# Patient Record
Sex: FEMALE | Race: WHITE | NOT HISPANIC OR LATINO | Employment: OTHER | URBAN - METROPOLITAN AREA
[De-identification: names, ages, dates, MRNs, and addresses within clinical notes are randomized per-mention and may not be internally consistent; named-entity substitution may affect disease eponyms.]

---

## 2023-11-21 ENCOUNTER — OFFICE VISIT (OUTPATIENT)
Dept: NEUROLOGY | Facility: CLINIC | Age: 83
End: 2023-11-21
Payer: MEDICARE

## 2023-11-21 VITALS
TEMPERATURE: 97.5 F | WEIGHT: 119 LBS | HEIGHT: 64 IN | DIASTOLIC BLOOD PRESSURE: 72 MMHG | HEART RATE: 62 BPM | SYSTOLIC BLOOD PRESSURE: 119 MMHG | BODY MASS INDEX: 20.32 KG/M2 | RESPIRATION RATE: 18 BRPM

## 2023-11-21 DIAGNOSIS — R25.1 TREMOR: Primary | ICD-10-CM

## 2023-11-21 DIAGNOSIS — G20.A1 PARKINSON'S DISEASE, UNSPECIFIED WHETHER DYSKINESIA PRESENT, UNSPECIFIED WHETHER MANIFESTATIONS FLUCTUATE (MULTI): ICD-10-CM

## 2023-11-21 DIAGNOSIS — R40.4 UNRESPONSIVE EPISODE: ICD-10-CM

## 2023-11-21 DIAGNOSIS — G40.909 SEIZURE DISORDER (MULTI): ICD-10-CM

## 2023-11-21 PROCEDURE — 1160F RVW MEDS BY RX/DR IN RCRD: CPT | Performed by: PSYCHIATRY & NEUROLOGY

## 2023-11-21 PROCEDURE — 1159F MED LIST DOCD IN RCRD: CPT | Performed by: PSYCHIATRY & NEUROLOGY

## 2023-11-21 PROCEDURE — 1036F TOBACCO NON-USER: CPT | Performed by: PSYCHIATRY & NEUROLOGY

## 2023-11-21 PROCEDURE — 99215 OFFICE O/P EST HI 40 MIN: CPT | Performed by: PSYCHIATRY & NEUROLOGY

## 2023-11-21 RX ORDER — APIXABAN 2.5 MG/1
2.5 TABLET, FILM COATED ORAL 2 TIMES DAILY
COMMUNITY
Start: 2023-10-27

## 2023-11-21 RX ORDER — BUSPIRONE HYDROCHLORIDE 15 MG/1
TABLET ORAL
COMMUNITY

## 2023-11-21 RX ORDER — LORAZEPAM 1 MG/1
TABLET ORAL
COMMUNITY

## 2023-11-21 RX ORDER — LEVOTHYROXINE SODIUM 50 UG/1
50 TABLET ORAL DAILY
COMMUNITY

## 2023-11-21 RX ORDER — LAMOTRIGINE 200 MG/1
1 TABLET ORAL 2 TIMES DAILY
COMMUNITY

## 2023-11-21 RX ORDER — EZETIMIBE AND SIMVASTATIN 10; 40 MG/1; MG/1
TABLET ORAL
COMMUNITY

## 2023-11-21 RX ORDER — LANOLIN ALCOHOL/MO/W.PET/CERES
1 CREAM (GRAM) TOPICAL DAILY
COMMUNITY

## 2023-11-21 RX ORDER — ACETAMINOPHEN 500 MG
5000 TABLET ORAL
COMMUNITY

## 2023-11-21 RX ORDER — ONDANSETRON 4 MG/1
TABLET, ORALLY DISINTEGRATING ORAL
COMMUNITY
Start: 2023-10-25

## 2023-11-21 RX ORDER — METOPROLOL TARTRATE 25 MG/1
25 TABLET, FILM COATED ORAL EVERY 12 HOURS
COMMUNITY
Start: 2023-10-27

## 2023-11-21 RX ORDER — SERTRALINE HYDROCHLORIDE 50 MG/1
50 TABLET, FILM COATED ORAL DAILY
COMMUNITY

## 2023-11-21 RX ORDER — DOCUSATE SODIUM 100 MG/1
100 CAPSULE, LIQUID FILLED ORAL 2 TIMES DAILY
COMMUNITY
Start: 2022-12-27

## 2023-11-21 RX ORDER — SITAGLIPTIN 100 MG/1
100 TABLET, FILM COATED ORAL DAILY
COMMUNITY

## 2023-11-21 RX ORDER — QUETIAPINE FUMARATE 50 MG/1
TABLET, FILM COATED ORAL
COMMUNITY
Start: 2022-12-27

## 2023-11-21 RX ORDER — FLUTICASONE PROPIONATE 50 MCG
SPRAY, SUSPENSION (ML) NASAL
COMMUNITY
Start: 2023-10-11

## 2023-11-21 RX ORDER — CARBIDOPA AND LEVODOPA 25; 100 MG/1; MG/1
TABLET ORAL
COMMUNITY

## 2023-11-21 RX ORDER — CARBIDOPA AND LEVODOPA 25; 100 MG/1; MG/1
TABLET, EXTENDED RELEASE ORAL
COMMUNITY

## 2023-11-21 NOTE — PROGRESS NOTES
"Subjective     Kathy Collins is a 83 y.o. year old female here for follow-up for Parkinson's, tremors and seizure- new problem.     HPI  Reason for visit states seizure.  Patient does have an epilepsy doctor.  Dr. Brannon last saw patient 2 years ago.  Her last visit with me 1 year ago, virtual.  She was recently hospitalized and I do not have those records.  She does not think she had seizures.   Accompanied by a granddaughter.    Sugars were high.   Last week during a meal, she slumped over and next thing she remembers was in hospital.    BP was high in hospital.   Her seroquel and ativan was changed 1 week prior to this. Daughter on phone today to provide more history. Also on Ativan as well.  Hydroxyzine is added for prn for anxiety.   No hallucinations.   still on oxygen.   She has fallen.   in the past she tried Rivastigmine patch for memory issues - could not tolerate it- she was on the patch for one night and she had a seizure.      March 25 2020- admission to EMU- on Keppra and VPA, continued to seizure, transferred to [facility] - loaded with fosphenytoin then VPA d/c'ed due to hyperammonemia. Lamictal was increased to 100mg BID and her Keppra and phenytoin were weaned off. last seizure recorded was 3/15.   EEG on video showed \"bilateral arm myoclonic seizure , R head versive seizure then b/l asymmetric clonic. Dr. Donaldson felt that she was close to dying during that hospitalization and she recovered rather well.      Tried TPX for HA but that caused more confusion.   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    prior to her seizure she gets a vertigo sensation, feels she cannot move.      hospitalized at [facility] for heart surgery had a CABG in late 2019.       Current PD meds:    Sinemet 2 /2 /1.5 /1 (8am/12pm/4pm/8pm)   Sinemet ER 1 tab at bedtime goes to bed 11pm - stiff and slow in morning.  20 minutes before she gets chin tremor , hand tremor.     hx:   She " "was diagnosed in August 2016- with PD, hx of tremor since her 20s. She thinks she has had some PD symptoms for a \"long time\"   Review of Systems    There is no problem list on file for this patient.    Past Medical History:   Diagnosis Date    Basal cell carcinoma of skin, unspecified 08/10/2018    Skin cancer, basal cell    Other conditions influencing health status 08/10/2018    Chronic migraine    Personal history of other endocrine, nutritional and metabolic disease 08/10/2018    History of hypothyroidism    Personal history of other endocrine, nutritional and metabolic disease 08/10/2018    History of type 2 diabetes mellitus    Personal history of other mental and behavioral disorders 08/10/2018    History of post traumatic stress disorder    Postural orthostatic tachycardia syndrome (POTS) 08/10/2018    POTS (postural orthostatic tachycardia syndrome)    Sciatica, unspecified side 08/10/2018    Sciatic pain     Past Surgical History:   Procedure Laterality Date    CATARACT EXTRACTION  08/10/2018    Cataract Surgery    HYSTERECTOMY  08/10/2018    Hysterectomy    MASTECTOMY  08/10/2018    Breast Surgery Mastectomy     Social History     Tobacco Use    Smoking status: Not on file    Smokeless tobacco: Not on file   Substance Use Topics    Alcohol use: Not on file     family history is not on file.  No current outpatient medications on file.  Not on File  There were no vitals taken for this visit.  Neurological Exam/Physical Exam:    Constitutional: General appearance: no acute distress. On oxygen.   Auscultation of Heart: Regular rate and rhythm, no murmurs, normal S1 and S2.   Carotid Arteries: no bruits  Peripheral Vascular Exam: No swelling, edema or tenderness to palpation in extremities  Mental status: no distress, alert, interactive and cooperative. Affect is appropriate.   Orientation: oriented to person, oriented to place and oriented to time.   Memory: intact  Attention: normal attention /concentration. "   Language: normal comprehension and naming.   Fund of knowledge: Patient displays adequate knowledge of current events.  Eyes: The ophthalmoscopic examination was normal.   Cranial nerve II: Visual fields full to confrontation.   Cranial nerves III, IV, and VI: Pupils round, equally reactive to light; EOMs intact. No nystagmus.   Cranial Nerve V: Facial sensation intact to LT bilaterally.   Cranial nerve VII: no facial droop  Cranial nerve VIII: Hearing is intact  Cranial nerves IX and X: Palate elevates symmetrically.   Cranial nerve XI: Shoulder shrug intact.   Cranial nerve XII: Tongue is midline.  Motor:  Muscle bulk was normal in both upper and lower extremities.     L> right rigidity. Muscle strength was 5/5 throughout. L > R bradykinesia, moderate kinetic tremor on left more than right. She also has a postural tremor left more than right. Very mild and subtle resting tremor.   Deep Tendon Reflexes: left biceps 2+ , right biceps 2+, left triceps 2+, right triceps 2+, left brachioradialis 2+, right brachioradialis 2+, left patella 2+, right patella 2+, left ankle jerk 2+, right ankle jerk 2+   Plantar Reflex: Toes downgoing to plantar stimulation on the left. Toes downgoing to plantar stimulation on the right.   Sensory Exam: Normal to vibratory sensation  Coordination:  no limb dystaxia and rapid alternating movements are intact.   Gait:  cautious.  Uses a cane.       Labs:  CBC:   Lab Results   Component Value Date    WBC 5.6 03/22/2020    HGB 11.0 (L) 03/22/2020    HCT 35.1 (L) 03/22/2020     03/22/2020     BMP:   Lab Results   Component Value Date     03/22/2020    K 3.8 03/22/2020     03/22/2020    CO2 29 03/22/2020    BUN 12 03/22/2020    CREATININE 0.68 03/22/2020    CALCIUM 8.6 03/22/2020    MG 1.91 03/20/2020    PHOS 3.3 03/18/2020     LFT:   Lab Results   Component Value Date    ALKPHOS 69 03/18/2020    BILITOT 0.4 03/18/2020    BILIDIR 0.1 03/15/2020    PROT 5.1 (L) 03/18/2020     ALBUMIN 3.0 (L) 03/18/2020    ALT <3 (L) 03/18/2020    AST 7 (L) 03/18/2020         Assessment/Plan   Problem List Items Addressed This Visit    None   Sugar related vs BP related.  Obtain EEG.  Hydrate well and compression socks will help. BP monitoring.

## 2023-11-21 NOTE — PATIENT INSTRUCTIONS
"It was a pleasure seeing you today.     This may be related to blood pressure changes.    Hydrate well , 64 ounces daily. Wear compression socks.     Take blood pressure and heart rate laying down , wait 2 min then take this again then sit and wait 2 minutes and take this again then stand and do this again ( 3 separate Blood pressure / heart rate). Record this and whether you are dizzy.    Obtain EEG and see Dr. Brannon from epilepsy.       You may have orthostatic hypotension ( which Seroquel can worsen).    If still having Blood pressure changes/dizziness and orthostatic hypotension - we may consider lowering Sinemet to 1.5 tabs at 8am/ 1. 5 tabs at 12pm/ 1.5 tabs at 4pm / 1 tab at 8pm   Please make a follow up appointment with me for Parkinson's /tremors in 4-6 months.      For any urgent issues or needing to speak to a medical assistant please call [phone], option 6 during our office hours Monday-Friday 8am-4pm, and leave a voicemail with your concern.  My office will try to reach back you as soon as possible within 24 (business) hours.  If you have an emergency please call 911 or visit a local urgent care or nearest emergency room.      Please understand that [employer] [facility] is a useful communication tool for simple \"normal\" results or a refill request but I would not recommend using this tool for emergent or urgent issues or for conversations with me.  I am happy to ask my staff to rearrange a follow up visit or a virtual visit sooner than requested if appropriate for your care.     "

## 2023-11-29 ENCOUNTER — HOSPITAL ENCOUNTER (OUTPATIENT)
Dept: NEUROLOGY | Facility: HOSPITAL | Age: 83
Discharge: HOME | End: 2023-11-29
Payer: MEDICARE

## 2023-11-29 DIAGNOSIS — R40.4 UNRESPONSIVE EPISODE: ICD-10-CM

## 2023-11-29 DIAGNOSIS — G40.909 SEIZURE DISORDER (MULTI): ICD-10-CM

## 2023-11-29 PROCEDURE — 95816 EEG AWAKE AND DROWSY: CPT | Performed by: STUDENT IN AN ORGANIZED HEALTH CARE EDUCATION/TRAINING PROGRAM

## 2023-11-29 PROCEDURE — 95816 EEG AWAKE AND DROWSY: CPT

## 2024-03-22 ENCOUNTER — APPOINTMENT (OUTPATIENT)
Dept: BEHAVIORAL HEALTH | Facility: CLINIC | Age: 84
End: 2024-03-22
Payer: MEDICARE

## 2024-08-22 ENCOUNTER — APPOINTMENT (OUTPATIENT)
Dept: NEUROLOGY | Facility: CLINIC | Age: 84
End: 2024-08-22
Payer: MEDICARE

## 2024-08-22 VITALS — WEIGHT: 121.6 LBS | HEART RATE: 71 BPM | HEIGHT: 64 IN | RESPIRATION RATE: 18 BRPM | BODY MASS INDEX: 20.76 KG/M2

## 2024-08-22 DIAGNOSIS — R25.1 TREMOR: ICD-10-CM

## 2024-08-22 DIAGNOSIS — G20.A1 PARKINSON'S DISEASE, UNSPECIFIED WHETHER DYSKINESIA PRESENT, UNSPECIFIED WHETHER MANIFESTATIONS FLUCTUATE (MULTI): Primary | ICD-10-CM

## 2024-08-22 PROCEDURE — 1157F ADVNC CARE PLAN IN RCRD: CPT | Performed by: PSYCHIATRY & NEUROLOGY

## 2024-08-22 PROCEDURE — G2211 COMPLEX E/M VISIT ADD ON: HCPCS | Performed by: PSYCHIATRY & NEUROLOGY

## 2024-08-22 PROCEDURE — 99214 OFFICE O/P EST MOD 30 MIN: CPT | Performed by: PSYCHIATRY & NEUROLOGY

## 2024-08-22 PROCEDURE — 1159F MED LIST DOCD IN RCRD: CPT | Performed by: PSYCHIATRY & NEUROLOGY

## 2024-08-22 PROCEDURE — 1160F RVW MEDS BY RX/DR IN RCRD: CPT | Performed by: PSYCHIATRY & NEUROLOGY

## 2024-08-22 PROCEDURE — 1036F TOBACCO NON-USER: CPT | Performed by: PSYCHIATRY & NEUROLOGY

## 2024-08-22 RX ORDER — MEMANTINE HYDROCHLORIDE 10 MG/1
10 TABLET ORAL DAILY
COMMUNITY

## 2024-08-22 RX ORDER — HYDROXYZINE HYDROCHLORIDE 10 MG/1
TABLET, FILM COATED ORAL DAILY PRN
COMMUNITY

## 2024-08-22 RX ORDER — SULFAMETHOXAZOLE AND TRIMETHOPRIM 400; 80 MG/1; MG/1
1 TABLET ORAL 2 TIMES DAILY
COMMUNITY

## 2024-08-22 RX ORDER — POLYETHYLENE GLYCOL 3350 17 G/17G
17 POWDER, FOR SOLUTION ORAL DAILY
COMMUNITY

## 2024-08-22 NOTE — PROGRESS NOTES
"Subjective     Kathy Collins is a 84 y.o. year old female here for follow-up for Parkinson's, tremors and seizure.    HPI    Accompanied by a granddaughter.      She is doing well.  Sleep is good.  She is falling, usually from a seated position.    She is seeing psychiatry soon as well.   Tremors seem worse to her.  Feeding/ eating is tough with tremors. Memory is not good.   Denies SI/HI.     No hallucinations.   still on oxygen.   in the past she tried Rivastigmine patch for memory issues - could not tolerate it- she was on the patch for one night and she had a seizure.      March 25 2020- admission to EMU- on Keppra and VPA, continued to seizure, transferred to [facility]U - loaded with fosphenytoin then VPA d/c'ed due to hyperammonemia. Lamictal was increased to 100mg BID and her Keppra and phenytoin were weaned off. last seizure recorded was 3/15.   EEG on video showed \"bilateral arm myoclonic seizure , R head versive seizure then b/l asymmetric clonic. Dr. Donaldson felt that she was close to dying during that hospitalization and she recovered rather well.      Tried TPX for HA but that caused more confusion.   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    prior to her seizure she gets a vertigo sensation, feels she cannot move.      hospitalized at [facility] for heart surgery had a CABG in late 2019.       Current PD meds:    Sinemet 2 /2 /1.5 /1 (8am/12pm/4pm/8pm)   Sinemet ER 1 tab at bedtime goes to bed 11pm - stiff and slow in morning.  20 minutes before she gets chin tremor , hand tremor.     hx:   She was diagnosed in August 2016- with PD, hx of tremor since her 20s. She thinks she has had some PD symptoms for a \"long time\"   Review of Systems    There is no problem list on file for this patient.    Past Medical History:   Diagnosis Date    Basal cell carcinoma of skin, unspecified 08/10/2018    Skin cancer, basal cell    Other conditions influencing health status " 08/10/2018    Chronic migraine    Personal history of other endocrine, nutritional and metabolic disease 08/10/2018    History of hypothyroidism    Personal history of other endocrine, nutritional and metabolic disease 08/10/2018    History of type 2 diabetes mellitus    Personal history of other mental and behavioral disorders 08/10/2018    History of post traumatic stress disorder    Postural orthostatic tachycardia syndrome (POTS) 08/10/2018    POTS (postural orthostatic tachycardia syndrome)    Sciatica, unspecified side 08/10/2018    Sciatic pain     Past Surgical History:   Procedure Laterality Date    CATARACT EXTRACTION  08/10/2018    Cataract Surgery    HYSTERECTOMY  08/10/2018    Hysterectomy    MASTECTOMY  08/10/2018    Breast Surgery Mastectomy     Social History     Tobacco Use    Smoking status: Former     Types: Cigarettes    Smokeless tobacco: Never   Substance Use Topics    Alcohol use: Not Currently     family history is not on file.    Current Outpatient Medications:     aspirin-calcium carbonate 81 mg-300 mg calcium(777 mg) tablet, Take 1 tablet by mouth once daily., Disp: , Rfl:     busPIRone (Buspar) 15 mg tablet, TAKE 1 TABLET (15MG) BY MOUTH 3 TIMES DAILY AND TAKE 1/2 TABLET (7.5MG) BY MOUTH AT BEDTIME, Disp: , Rfl:     carbidopa-levodopa (Sinemet CR)  mg ER tablet, TAKE ONE(1) TABLET BY MOUTH ONCE DAILY **DO NOT CRUSH**, Disp: , Rfl:     carbidopa-levodopa (Sinemet)  mg tablet, MAKE 2 TABLETS BY MOUTH DAILY AT 8AM AND NOON, TAKE 1.5 TABS DAILY AT 4PM AND 1 TAB DAILY AT 8PM **STO, Disp: , Rfl:     cholecalciferol (Vitamin D-3) 5,000 Units tablet, Take 1 tablet (5,000 Units) by mouth once daily., Disp: , Rfl:     cyanocobalamin (Vitamin B-12) 1,000 mcg tablet, Take 1 tablet (1,000 mcg) by mouth once daily., Disp: , Rfl:     docusate sodium (Colace) 100 mg capsule, Take 1 capsule (100 mg) by mouth 2 times a day., Disp: , Rfl:     Eliquis 2.5 mg tablet, Take 1 tablet (2.5 mg) by  mouth 2 times a day., Disp: , Rfl:     ezetimibe-simvastatin (Vytorin) 10-40 mg tablet, TAKE ONE(1) TABLET BY MOUTH ONCE DAILY, Disp: , Rfl:     fluticasone (Flonase) 50 mcg/actuation nasal spray, USE 1 SPRAY INTO EACH NOSTRIL TWICE DAILY, Disp: , Rfl:     Januvia 100 mg tablet, Take 1 tablet (100 mg) by mouth once daily., Disp: , Rfl:     lamoTRIgine (LaMICtal) 200 mg tablet, Take 1 tablet (200 mg) by mouth 2 times a day., Disp: , Rfl:     levothyroxine (Synthroid, Levoxyl) 50 mcg tablet, Take 1 tablet (50 mcg) by mouth once daily., Disp: , Rfl:     LORazepam (Ativan) 1 mg tablet, TAKE 1 TABLET BY MOUTH 3 TIMES DAILY **RE-ORDER ACCORDINGLY**, Disp: , Rfl:     metoprolol tartrate (Lopressor) 25 mg tablet, Take 1 tablet (25 mg) by mouth every 12 hours., Disp: , Rfl:     ondansetron ODT (Zofran-ODT) 4 mg disintegrating tablet, DISSOLVE 1 TABLET S/L EVERY 6 HOURS FOR NAUSEA., Disp: , Rfl:     QUEtiapine (SEROquel) 50 mg tablet, TAKE ONE(1) TABLET BY MOUTH ONCE DAILY, Disp: , Rfl:     sertraline (Zoloft) 50 mg tablet, Take 1 tablet (50 mg) by mouth once daily., Disp: , Rfl:   No Known Allergies  There were no vitals taken for this visit.  Neurological Exam/Physical Exam:    Constitutional: General appearance: no acute distress. On oxygen.   Auscultation of Heart: Regular rate and rhythm, no murmurs, normal S1 and S2.   Carotid Arteries: no bruits  Peripheral Vascular Exam: No swelling, edema or tenderness to palpation in extremities  Mental status: no distress, alert, interactive and cooperative. Affect is appropriate.   Orientation: oriented to person, oriented to place and oriented to time.     Fund of knowledge: Patient displays adequate knowledge of current events.  Eyes: The ophthalmoscopic examination was normal.   Cranial nerve II: Visual fields full to confrontation.   Cranial nerves III, IV, and VI: Pupils round, equally reactive to light; EOMs intact. No nystagmus.   Cranial Nerve V: Facial sensation intact to  LT bilaterally.   Cranial nerve VII: no facial droop  Cranial nerve VIII: Hearing is intact  Cranial nerves IX and X: Palate elevates symmetrically.   Cranial nerve XI: Shoulder shrug intact.   Cranial nerve XII: Tongue is midline.  Motor:  Muscle bulk was normal in both upper and lower extremities.     L> right rigidity. Muscle strength was 5/5 throughout. L > R bradykinesia, moderate kinetic tremor on left more than right. She also has a postural tremor left more than right. Very mild and subtle resting tremor.   Deep Tendon Reflexes: left biceps 2+ , right biceps 2+, left triceps 2+, right triceps 2+, left brachioradialis 2+, right brachioradialis 2+, left patella 2+, right patella 2+, left ankle jerk 2+, right ankle jerk 2+   Plantar Reflex: Toes downgoing to plantar stimulation on the left. Toes downgoing to plantar stimulation on the right.   Sensory Exam: Normal to vibratory sensation  Coordination:  no limb dystaxia and rapid alternating movements are intact.   Gait:  cautious.  Uses a cane.       Labs:  CBC:   Lab Results   Component Value Date    WBC 5.6 03/22/2020    HGB 11.0 (L) 03/22/2020    HCT 35.1 (L) 03/22/2020     03/22/2020     BMP:   Lab Results   Component Value Date     03/22/2020    K 3.8 03/22/2020     03/22/2020    CO2 29 03/22/2020    BUN 12 03/22/2020    CREATININE 0.68 03/22/2020    CALCIUM 8.6 03/22/2020    MG 1.91 03/20/2020    PHOS 3.3 03/18/2020     LFT:   Lab Results   Component Value Date    ALKPHOS 69 03/18/2020    BILITOT 0.4 03/18/2020    BILIDIR 0.1 03/15/2020    PROT 5.1 (L) 03/18/2020    ALBUMIN 3.0 (L) 03/18/2020    ALT <3 (L) 03/18/2020    AST 7 (L) 03/18/2020         Assessment/Plan   Problem List Items Addressed This Visit    None  No changes to medications.  Balance and tremors are worse.

## 2024-08-22 NOTE — PATIENT INSTRUCTIONS
"It was a pleasure seeing you today.     Please make a follow up appointment in 5-6 months.  You may also schedule a phone or virtual visit sooner on a Friday morning with me as needed before the next clinic appointment.     For any urgent issues or needing to speak to a medical assistant please call [phone], option 6 during our office hours Monday-Friday 8am-4pm, and leave a voicemail with your concern.  My office will try to reach back you as soon as possible within 24 (business) hours.  If you have an emergency please call 911 or visit a local urgent care or nearest emergency room.      Please understand that [employer] is a useful communication tool for simple \"normal\" results or a refill request but I would not recommend using this tool for emergent or urgent issues or for conversations with me.  I am happy to ask my staff to rearrange a follow up visit or a virtual visit sooner than requested if appropriate for your care.    "

## 2024-09-20 ENCOUNTER — APPOINTMENT (OUTPATIENT)
Dept: BEHAVIORAL HEALTH | Facility: CLINIC | Age: 84
End: 2024-09-20
Payer: MEDICARE

## 2024-09-20 DIAGNOSIS — R44.1 VISUAL HALLUCINATION: ICD-10-CM

## 2024-09-20 DIAGNOSIS — F41.1 GAD (GENERALIZED ANXIETY DISORDER): ICD-10-CM

## 2024-09-20 DIAGNOSIS — F32.89 OTHER DEPRESSION: ICD-10-CM

## 2024-09-20 PROCEDURE — 1157F ADVNC CARE PLAN IN RCRD: CPT | Performed by: PSYCHIATRY & NEUROLOGY

## 2024-09-20 PROCEDURE — 99214 OFFICE O/P EST MOD 30 MIN: CPT | Performed by: PSYCHIATRY & NEUROLOGY

## 2024-09-20 RX ORDER — MEGESTROL ACETATE 40 MG/ML
SUSPENSION ORAL DAILY
COMMUNITY

## 2024-09-20 RX ORDER — FOLIC ACID 0.4 MG
TABLET ORAL DAILY
COMMUNITY

## 2024-09-20 NOTE — PROGRESS NOTES
"Outpatient Psychiatry Follow up visit    Ms. Kathy Collins is an 84 year old woman with a history of generalized anxiety disorder, mood disorder, cognitive impairment, essential tremors and Parkinson's disease. Follow up done virtually today.   Seen with daughter Ruth.     Virtual or Telephone Consent    An interactive audio and video telecommunication system which permits real time communications between the patient (at the originating site) and provider (at the distant site) was utilized to provide this telehealth service.   Verbal consent was requested and obtained from Kathy Collins on this date, 09/20/24 for a telehealth visit.     Subjective:     She says she is feeling \"really good.\"     She had a bout of CoVID about 3 weeks ago and had hallucinations, confusion, falls. Ruth notes that patient was asking nurses to get her granddaughter ready for school or looking for Ruth. These symptoms have improved.     Patient says she still sees a cat. She says it is not bothersome.   She may confuse Ruth with another nurse.      She says she is in assisted living ([facility]) in October '22.   She is taking care of her own ADLs.       She nurses administer the medications.   She says she is eating well. Had lost weight when she had COVID but has gained weight back.   Sleep - says it is okay.   Denies any death wishes.  Denies any suicidal thoughts, intent or plans.      She says she still feels anxious. She says that as long as nothing is bothering her, she is okay, but every day around 4 pm, she starts to feel irritable.     She tried rivastigmine patch for memory but could not tolerate it.   Does not drive.     She has slight trouble swallowing. She has to watch what she is eating. She is taking pills with apple sauce.      She says she goes to programs and groups at the assisted living; she says they have a lot of activities.     She has PT. Her balance is still not good. " "She uses a rollator most of the time.      She is on oxygen 24/7.      She has not had any seizures recently.      She continues to see psychologist Dr. Juan Miguel Licona.      No change in PD medications.      I reviewed medication list from facility. Current medication list includes:   Quetiapine 50mg at bedtime  Sertraline 75mg once daily  Buspar 15mg in the morning, 15mg at 2pm, 15mg in the evening, 7.5mg at bedtime  Lamotrigine 200mg twice daily (also for seizures).  Ativan 1mg in the morning and 1mg around 4pm.   Memantine 10mg daily  Hydroxyzine 10mg three times daily as needed for anxiety    Psychiatric Review Of Systems:   As above.     Medical Review Of Systems:    Pertinent items are noted in HPI.    Record Review: moderate       Mental Status Evaluation:  Appearance: Appears to be stated age. Well groomed. Good hygiene.   Behavior/Attitude: Cooperative. Pleasant.   Motor: Psychomotor activity in average range. Tremors in hands.   Speech: Soft.   Mood: \"really good\"  Affect: Congruent to stated mood. Mobilized appropriately. In no apparent distress.   Thought process: Fairly goal-directed.   Thought content: No paranoia, delusion or ideas of reference elicited. Reports visual hallucinations. No recent paranoia.   Suicidal ideation: denied.  Homicidal ideation: denied.   Insight: Partial.   Judgment: Fair.   Recent and remote memory: impaired.   Attention/concentration: intact during appointment.   Language: No aphasia or paraphasic errors. Had word-finding difficulties.   Fund of knowledge: Average.     PMH/PSH:  Past Medical History:   Diagnosis Date    Basal cell carcinoma of skin, unspecified 08/10/2018    Skin cancer, basal cell    Other conditions influencing health status 08/10/2018    Chronic migraine    Personal history of other endocrine, nutritional and metabolic disease 08/10/2018    History of hypothyroidism    Personal history of other endocrine, nutritional and metabolic disease 08/10/2018    " History of type 2 diabetes mellitus    Personal history of other mental and behavioral disorders 08/10/2018    History of post traumatic stress disorder    Postural orthostatic tachycardia syndrome (POTS) 08/10/2018    POTS (postural orthostatic tachycardia syndrome)    Sciatica, unspecified side 08/10/2018    Sciatic pain        Meds  Current Outpatient Medications on File Prior to Visit   Medication Sig Dispense Refill    aspirin-calcium carbonate 81 mg-300 mg calcium(777 mg) tablet Take 1 tablet by mouth once daily.      B complex-vitamin C-folic acid (Nephro-[facility] Rx) 1- mg-mg-mcg tablet Take 1 tablet by mouth once daily with breakfast.      busPIRone (Buspar) 15 mg tablet TAKE 1 TABLET (15MG) BY MOUTH 3 TIMES DAILY AND TAKE 1/2 TABLET (7.5MG) BY MOUTH AT BEDTIME      carbidopa-levodopa (Sinemet CR)  mg ER tablet TAKE ONE(1) TABLET BY MOUTH ONCE DAILY **DO NOT CRUSH**      carbidopa-levodopa (Sinemet)  mg tablet MAKE 2 TABLETS BY MOUTH DAILY AT 8AM AND NOON, TAKE 1.5 TABS DAILY AT 4PM AND 1 TAB DAILY AT 8PM **STO      cholecalciferol (Vitamin D-3) 5,000 Units tablet Take 1 tablet (5,000 Units) by mouth once daily.      cyanocobalamin (Vitamin B-12) 1,000 mcg tablet Take 1 tablet (1,000 mcg) by mouth once daily.      docusate sodium (Colace) 100 mg capsule Take 1 capsule (100 mg) by mouth 2 times a day.      Eliquis 2.5 mg tablet Take 1 tablet (2.5 mg) by mouth 2 times a day.      ezetimibe-simvastatin (Vytorin) 10-40 mg tablet TAKE ONE(1) TABLET BY MOUTH ONCE DAILY      fluticasone (Flonase) 50 mcg/actuation nasal spray USE 1 SPRAY INTO EACH NOSTRIL TWICE DAILY      hydrOXYzine HCL (Atarax) 10 mg tablet Take by mouth once daily as needed for itching.      Januvia 100 mg tablet Take 1 tablet (100 mg) by mouth once daily.      lamoTRIgine (LaMICtal) 200 mg tablet Take 1 tablet (200 mg) by mouth 2 times a day.      levothyroxine (Synthroid, Levoxyl) 75 mcg tablet Take 1 tablet (75 mcg) by mouth  once daily.      LORazepam (Ativan) 1 mg tablet 1 tablet (1 mg) 2 times a day.      memantine (Namenda) 10 mg tablet Take 1 tablet (10 mg) by mouth once daily.      metoprolol tartrate (Lopressor) 25 mg tablet Take 1 tablet (25 mg) by mouth every 12 hours.      ondansetron ODT (Zofran-ODT) 4 mg disintegrating tablet DISSOLVE 1 TABLET S/L EVERY 6 HOURS FOR NAUSEA.      polyethylene glycol (Glycolax, Miralax) 17 gram packet Take 17 g by mouth once daily.      QUEtiapine (SEROquel) 50 mg tablet TAKE ONE(1) TABLET BY MOUTH ONCE DAILY      sertraline (Zoloft) 50 mg tablet Take 1.5 tablets (75 mg) by mouth once daily.      sulfamethoxazole-trimethoprim (Bactrim) 400-80 mg tablet Take 1 tablet by mouth 2 times a day.       No current facility-administered medications on file prior to visit.        Allergies:   No Known Allergies      Assessment/Plan   Assessment:   Ms. Kathy Collins is an 84 year old woman with a history of generalized anxiety disorder, mood disorder, cognitive impairment, essential tremors and Parkinson's disease. Follow up done virtually today.      9/20/24 - She had increased confusion due to COVID. She has been more anxious, specifically at 4pm every day.     Diagnosis:   Generalized anxiety disorder  Mood disorder (?bipolar disorder)   Visual hallucinations  Cognitive impairment - likely multifactorial.  History of PTSD    Treatment Plan/Recommendations:   - Continue quetiapine 50mg at bedtime. Monitor hallucinations for any worsening.   - Discussed that her increase in anxiety at 4pm could be related to medication timing. Continue lorazepam 1mg twice daily but take second dose at 2pm.   - Advised to limit use of hydroxyzine as much as possible.   - Continue buspar - 15mg at 8am, 12pm and 4pm; 7.5mg at bedtime.   - Continue sertraline 75mg once daily.   - Continue lamictal 200mg twice daily (as prescribed by PCP).   - Follow up in about 2-3 months.      Darling Christianson MD.

## 2024-09-20 NOTE — PATIENT INSTRUCTIONS
Plan:   - Change lorazepam to 1mg in the morning and 1mg at 2pm.   - Continue quetiapine 50mg at bedtime. Monitor hallucinations for any worsening.   - Try to limit use of hydroxyzine as much as possible.   - Continue buspar - 15mg at 8am, 12pm and 4pm; 7.5mg at bedtime.   - Continue sertraline 75mg once daily.   - Continue lamictal 200mg twice daily (as prescribed by PCP).   - Follow up in about 2-3 months.  - Contact via [employer] or call sooner ([phone]) in case of any questions or concerns.  - Call [phone] for mental health crisis or suicidal thoughts, or call 911 or go to the nearest emergency room for emergencies.    Brain-healthy lifestyle:   - Make sure your medical conditions (if any) such as diabetes, high blood pressure, high cholesterol, thyroid disease sleep apnea are optimally controlled.   - Use eyeglasses or hearing aids appropriately if needed.   - Eat a heart healthy diet (like a Mediterranean diet; lots of fruits and vegetables, fish; low fat;)  - Exercise regularly as tolerated.   - Maintain good sleep hygiene; avoid daytime naps; try to get 7 to 8 hours of continuous sleep at night.   - Stay mentally active - puzzles, word searches, books, playing cards.  - Stay socially active and engaged.

## 2024-12-03 ENCOUNTER — APPOINTMENT (OUTPATIENT)
Dept: RADIOLOGY | Facility: HOSPITAL | Age: 84
End: 2024-12-03
Payer: MEDICARE

## 2024-12-03 ENCOUNTER — TELEPHONE (OUTPATIENT)
Dept: NEUROLOGY | Facility: CLINIC | Age: 84
End: 2024-12-03
Payer: MEDICARE

## 2024-12-03 ENCOUNTER — HOSPITAL ENCOUNTER (OUTPATIENT)
Facility: HOSPITAL | Age: 84
Setting detail: OBSERVATION
Discharge: HOME | End: 2024-12-04
Attending: STUDENT IN AN ORGANIZED HEALTH CARE EDUCATION/TRAINING PROGRAM | Admitting: EMERGENCY MEDICINE
Payer: MEDICARE

## 2024-12-03 DIAGNOSIS — N30.00 ACUTE CYSTITIS WITHOUT HEMATURIA: ICD-10-CM

## 2024-12-03 DIAGNOSIS — R41.82 ALTERED MENTAL STATUS, UNSPECIFIED ALTERED MENTAL STATUS TYPE: Primary | ICD-10-CM

## 2024-12-03 LAB
ALBUMIN SERPL BCP-MCNC: 4.5 G/DL (ref 3.4–5)
ALP SERPL-CCNC: 93 U/L (ref 33–136)
ALT SERPL W P-5'-P-CCNC: <3 U/L (ref 7–45)
ANION GAP SERPL CALC-SCNC: 14 MMOL/L (ref 10–20)
APPEARANCE UR: CLEAR
AST SERPL W P-5'-P-CCNC: 12 U/L (ref 9–39)
BASOPHILS # BLD AUTO: 0.05 X10*3/UL (ref 0–0.1)
BASOPHILS NFR BLD AUTO: 0.5 %
BILIRUB SERPL-MCNC: 0.4 MG/DL (ref 0–1.2)
BILIRUB UR STRIP.AUTO-MCNC: NEGATIVE MG/DL
BUN SERPL-MCNC: 27 MG/DL (ref 6–23)
CALCIUM SERPL-MCNC: 9.2 MG/DL (ref 8.6–10.6)
CHLORIDE SERPL-SCNC: 103 MMOL/L (ref 98–107)
CO2 SERPL-SCNC: 26 MMOL/L (ref 21–32)
COLOR UR: YELLOW
CREAT SERPL-MCNC: 1.57 MG/DL (ref 0.5–1.05)
EGFRCR SERPLBLD CKD-EPI 2021: 32 ML/MIN/1.73M*2
EOSINOPHIL # BLD AUTO: 0.25 X10*3/UL (ref 0–0.4)
EOSINOPHIL NFR BLD AUTO: 2.5 %
ERYTHROCYTE [DISTWIDTH] IN BLOOD BY AUTOMATED COUNT: 13.2 % (ref 11.5–14.5)
GLUCOSE SERPL-MCNC: 157 MG/DL (ref 74–99)
GLUCOSE UR STRIP.AUTO-MCNC: NORMAL MG/DL
HCT VFR BLD AUTO: 35.4 % (ref 36–46)
HGB BLD-MCNC: 11.8 G/DL (ref 12–16)
HOLD SPECIMEN: NORMAL
IMM GRANULOCYTES # BLD AUTO: 0.06 X10*3/UL (ref 0–0.5)
IMM GRANULOCYTES NFR BLD AUTO: 0.6 % (ref 0–0.9)
KETONES UR STRIP.AUTO-MCNC: NEGATIVE MG/DL
LEUKOCYTE ESTERASE UR QL STRIP.AUTO: ABNORMAL
LYMPHOCYTES # BLD AUTO: 1.92 X10*3/UL (ref 0.8–3)
LYMPHOCYTES NFR BLD AUTO: 19.5 %
MCH RBC QN AUTO: 30.7 PG (ref 26–34)
MCHC RBC AUTO-ENTMCNC: 33.3 G/DL (ref 32–36)
MCV RBC AUTO: 92 FL (ref 80–100)
MONOCYTES # BLD AUTO: 0.74 X10*3/UL (ref 0.05–0.8)
MONOCYTES NFR BLD AUTO: 7.5 %
MUCOUS THREADS #/AREA URNS AUTO: ABNORMAL /LPF
NEUTROPHILS # BLD AUTO: 6.82 X10*3/UL (ref 1.6–5.5)
NEUTROPHILS NFR BLD AUTO: 69.4 %
NITRITE UR QL STRIP.AUTO: NEGATIVE
NRBC BLD-RTO: 0 /100 WBCS (ref 0–0)
PH UR STRIP.AUTO: 5.5 [PH]
PLATELET # BLD AUTO: 235 X10*3/UL (ref 150–450)
POTASSIUM SERPL-SCNC: 4.1 MMOL/L (ref 3.5–5.3)
PROT SERPL-MCNC: 7.6 G/DL (ref 6.4–8.2)
PROT UR STRIP.AUTO-MCNC: NEGATIVE MG/DL
RBC # BLD AUTO: 3.84 X10*6/UL (ref 4–5.2)
RBC # UR STRIP.AUTO: NEGATIVE /UL
RBC #/AREA URNS AUTO: ABNORMAL /HPF
SODIUM SERPL-SCNC: 139 MMOL/L (ref 136–145)
SP GR UR STRIP.AUTO: 1.01
UROBILINOGEN UR STRIP.AUTO-MCNC: NORMAL MG/DL
WBC # BLD AUTO: 9.8 X10*3/UL (ref 4.4–11.3)
WBC #/AREA URNS AUTO: ABNORMAL /HPF

## 2024-12-03 PROCEDURE — 81001 URINALYSIS AUTO W/SCOPE: CPT | Performed by: STUDENT IN AN ORGANIZED HEALTH CARE EDUCATION/TRAINING PROGRAM

## 2024-12-03 PROCEDURE — 99285 EMERGENCY DEPT VISIT HI MDM: CPT | Mod: 25

## 2024-12-03 PROCEDURE — 2500000004 HC RX 250 GENERAL PHARMACY W/ HCPCS (ALT 636 FOR OP/ED)

## 2024-12-03 PROCEDURE — 36415 COLL VENOUS BLD VENIPUNCTURE: CPT | Performed by: STUDENT IN AN ORGANIZED HEALTH CARE EDUCATION/TRAINING PROGRAM

## 2024-12-03 PROCEDURE — 72100 X-RAY EXAM L-S SPINE 2/3 VWS: CPT

## 2024-12-03 PROCEDURE — 80053 COMPREHEN METABOLIC PANEL: CPT | Performed by: STUDENT IN AN ORGANIZED HEALTH CARE EDUCATION/TRAINING PROGRAM

## 2024-12-03 PROCEDURE — 74176 CT ABD & PELVIS W/O CONTRAST: CPT

## 2024-12-03 PROCEDURE — 96365 THER/PROPH/DIAG IV INF INIT: CPT

## 2024-12-03 PROCEDURE — 72131 CT LUMBAR SPINE W/O DYE: CPT | Mod: RCN

## 2024-12-03 PROCEDURE — 71045 X-RAY EXAM CHEST 1 VIEW: CPT | Performed by: STUDENT IN AN ORGANIZED HEALTH CARE EDUCATION/TRAINING PROGRAM

## 2024-12-03 PROCEDURE — 71045 X-RAY EXAM CHEST 1 VIEW: CPT

## 2024-12-03 PROCEDURE — 70450 CT HEAD/BRAIN W/O DYE: CPT

## 2024-12-03 PROCEDURE — 85025 COMPLETE CBC W/AUTO DIFF WBC: CPT | Performed by: STUDENT IN AN ORGANIZED HEALTH CARE EDUCATION/TRAINING PROGRAM

## 2024-12-03 PROCEDURE — 99283 EMERGENCY DEPT VISIT LOW MDM: CPT | Mod: 25 | Performed by: STUDENT IN AN ORGANIZED HEALTH CARE EDUCATION/TRAINING PROGRAM

## 2024-12-03 PROCEDURE — 70450 CT HEAD/BRAIN W/O DYE: CPT | Performed by: STUDENT IN AN ORGANIZED HEALTH CARE EDUCATION/TRAINING PROGRAM

## 2024-12-03 PROCEDURE — 72100 X-RAY EXAM L-S SPINE 2/3 VWS: CPT | Performed by: STUDENT IN AN ORGANIZED HEALTH CARE EDUCATION/TRAINING PROGRAM

## 2024-12-03 RX ORDER — CEFTRIAXONE 1 G/50ML
1 INJECTION, SOLUTION INTRAVENOUS ONCE
Status: COMPLETED | OUTPATIENT
Start: 2024-12-03 | End: 2024-12-03

## 2024-12-03 ASSESSMENT — LIFESTYLE VARIABLES
TOTAL SCORE: 0
HAVE PEOPLE ANNOYED YOU BY CRITICIZING YOUR DRINKING: NO
HAVE YOU EVER FELT YOU SHOULD CUT DOWN ON YOUR DRINKING: NO
EVER FELT BAD OR GUILTY ABOUT YOUR DRINKING: NO
EVER HAD A DRINK FIRST THING IN THE MORNING TO STEADY YOUR NERVES TO GET RID OF A HANGOVER: NO

## 2024-12-03 ASSESSMENT — COLUMBIA-SUICIDE SEVERITY RATING SCALE - C-SSRS
2. HAVE YOU ACTUALLY HAD ANY THOUGHTS OF KILLING YOURSELF?: NO
1. IN THE PAST MONTH, HAVE YOU WISHED YOU WERE DEAD OR WISHED YOU COULD GO TO SLEEP AND NOT WAKE UP?: NO
6. HAVE YOU EVER DONE ANYTHING, STARTED TO DO ANYTHING, OR PREPARED TO DO ANYTHING TO END YOUR LIFE?: NO

## 2024-12-03 ASSESSMENT — PAIN SCALES - GENERAL: PAINLEVEL_OUTOF10: 0 - NO PAIN

## 2024-12-03 ASSESSMENT — PAIN - FUNCTIONAL ASSESSMENT: PAIN_FUNCTIONAL_ASSESSMENT: 0-10

## 2024-12-03 NOTE — TELEPHONE ENCOUNTER
Patients daughter Ruth called in stating that the assisting living requested she take Kathy to the Emergency room as she is hallucinating, seems very confused, she is forgetting how to eat and dress herself, and what was more alarming is that she was having strange episodes of a possible seizure as she is just staring off into the distance. [place] ED off of [address] and wanted to make you aware.

## 2024-12-03 NOTE — ED TRIAGE NOTES
Per family in triage, pt admitted 2 weeks ago for altered mental status and dehydration w/ SNF placement, had temporarily been off of sinement/buspar/seroquel and has led to worsening altered mental status. Per family, nursing staff has found her wandering in nursing home in the middle of the night and having staring episodes reminiscent of seizures. Pt A&Ox3, but mildly confused. Does have hx grand mal seizures, quadruple bypass, stroke, parkinsons, recurrent falls, dysphagia, UTIs

## 2024-12-04 VITALS
TEMPERATURE: 98.5 F | WEIGHT: 120 LBS | OXYGEN SATURATION: 94 % | RESPIRATION RATE: 16 BRPM | HEART RATE: 72 BPM | HEIGHT: 60 IN | DIASTOLIC BLOOD PRESSURE: 81 MMHG | SYSTOLIC BLOOD PRESSURE: 145 MMHG | BODY MASS INDEX: 23.56 KG/M2

## 2024-12-04 PROBLEM — N39.0 UTI (URINARY TRACT INFECTION): Status: ACTIVE | Noted: 2024-12-04

## 2024-12-04 LAB
ANION GAP SERPL CALC-SCNC: 10 MMOL/L (ref 10–20)
BUN SERPL-MCNC: 21 MG/DL (ref 6–23)
CALCIUM SERPL-MCNC: 8.4 MG/DL (ref 8.6–10.6)
CHLORIDE SERPL-SCNC: 108 MMOL/L (ref 98–107)
CO2 SERPL-SCNC: 27 MMOL/L (ref 21–32)
CREAT SERPL-MCNC: 1.02 MG/DL (ref 0.5–1.05)
EGFRCR SERPLBLD CKD-EPI 2021: 54 ML/MIN/1.73M*2
GLUCOSE SERPL-MCNC: 188 MG/DL (ref 74–99)
POTASSIUM SERPL-SCNC: 4 MMOL/L (ref 3.5–5.3)
SODIUM SERPL-SCNC: 141 MMOL/L (ref 136–145)

## 2024-12-04 PROCEDURE — 97161 PT EVAL LOW COMPLEX 20 MIN: CPT | Mod: GP

## 2024-12-04 PROCEDURE — G0378 HOSPITAL OBSERVATION PER HR: HCPCS

## 2024-12-04 PROCEDURE — 80048 BASIC METABOLIC PNL TOTAL CA: CPT | Performed by: PHYSICIAN ASSISTANT

## 2024-12-04 PROCEDURE — 2500000004 HC RX 250 GENERAL PHARMACY W/ HCPCS (ALT 636 FOR OP/ED): Performed by: PHYSICIAN ASSISTANT

## 2024-12-04 PROCEDURE — 96366 THER/PROPH/DIAG IV INF ADDON: CPT

## 2024-12-04 PROCEDURE — 2500000001 HC RX 250 WO HCPCS SELF ADMINISTERED DRUGS (ALT 637 FOR MEDICARE OP): Performed by: PHYSICIAN ASSISTANT

## 2024-12-04 PROCEDURE — 96361 HYDRATE IV INFUSION ADD-ON: CPT

## 2024-12-04 PROCEDURE — 36415 COLL VENOUS BLD VENIPUNCTURE: CPT | Performed by: PHYSICIAN ASSISTANT

## 2024-12-04 PROCEDURE — 97530 THERAPEUTIC ACTIVITIES: CPT | Mod: GP

## 2024-12-04 PROCEDURE — 2500000002 HC RX 250 W HCPCS SELF ADMINISTERED DRUGS (ALT 637 FOR MEDICARE OP, ALT 636 FOR OP/ED): Performed by: PHYSICIAN ASSISTANT

## 2024-12-04 RX ORDER — SODIUM CHLORIDE, SODIUM LACTATE, POTASSIUM CHLORIDE, CALCIUM CHLORIDE 600; 310; 30; 20 MG/100ML; MG/100ML; MG/100ML; MG/100ML
125 INJECTION, SOLUTION INTRAVENOUS CONTINUOUS
Status: DISCONTINUED | OUTPATIENT
Start: 2024-12-04 | End: 2024-12-04 | Stop reason: HOSPADM

## 2024-12-04 RX ORDER — SODIUM CHLORIDE, SODIUM LACTATE, POTASSIUM CHLORIDE, CALCIUM CHLORIDE 600; 310; 30; 20 MG/100ML; MG/100ML; MG/100ML; MG/100ML
125 INJECTION, SOLUTION INTRAVENOUS CONTINUOUS
Status: DISCONTINUED | OUTPATIENT
Start: 2024-12-04 | End: 2024-12-04

## 2024-12-04 RX ORDER — LEVOTHYROXINE SODIUM 75 UG/1
75 TABLET ORAL DAILY
Status: DISCONTINUED | OUTPATIENT
Start: 2024-12-04 | End: 2024-12-04 | Stop reason: HOSPADM

## 2024-12-04 RX ORDER — CEFTRIAXONE 1 G/50ML
1 INJECTION, SOLUTION INTRAVENOUS EVERY 24 HOURS
Status: DISCONTINUED | OUTPATIENT
Start: 2024-12-04 | End: 2024-12-04 | Stop reason: HOSPADM

## 2024-12-04 RX ORDER — CARBIDOPA AND LEVODOPA 25; 100 MG/1; MG/1
1 TABLET ORAL DAILY
Status: DISCONTINUED | OUTPATIENT
Start: 2024-12-04 | End: 2024-12-04 | Stop reason: HOSPADM

## 2024-12-04 RX ORDER — CARBIDOPA AND LEVODOPA 25; 100 MG/1; MG/1
1.5 TABLET ORAL DAILY
Status: DISCONTINUED | OUTPATIENT
Start: 2024-12-04 | End: 2024-12-04 | Stop reason: HOSPADM

## 2024-12-04 RX ORDER — DOCUSATE SODIUM 100 MG/1
100 CAPSULE, LIQUID FILLED ORAL 2 TIMES DAILY
Status: DISCONTINUED | OUTPATIENT
Start: 2024-12-04 | End: 2024-12-04 | Stop reason: HOSPADM

## 2024-12-04 RX ORDER — METOPROLOL TARTRATE 25 MG/1
25 TABLET, FILM COATED ORAL EVERY 12 HOURS
Status: DISCONTINUED | OUTPATIENT
Start: 2024-12-04 | End: 2024-12-04 | Stop reason: HOSPADM

## 2024-12-04 RX ORDER — LAMOTRIGINE 100 MG/1
200 TABLET ORAL 2 TIMES DAILY
Status: DISCONTINUED | OUTPATIENT
Start: 2024-12-04 | End: 2024-12-04 | Stop reason: HOSPADM

## 2024-12-04 RX ORDER — MEMANTINE HYDROCHLORIDE 10 MG/1
10 TABLET ORAL NIGHTLY
Status: DISCONTINUED | OUTPATIENT
Start: 2024-12-04 | End: 2024-12-04 | Stop reason: HOSPADM

## 2024-12-04 RX ORDER — BUSPIRONE HYDROCHLORIDE 15 MG/1
15 TABLET ORAL 3 TIMES DAILY
Status: DISCONTINUED | OUTPATIENT
Start: 2024-12-04 | End: 2024-12-04 | Stop reason: HOSPADM

## 2024-12-04 RX ORDER — CEPHALEXIN 500 MG/1
500 CAPSULE ORAL 3 TIMES DAILY
Qty: 21 CAPSULE | Refills: 0 | Status: SHIPPED | OUTPATIENT
Start: 2024-12-04 | End: 2024-12-11

## 2024-12-04 RX ORDER — CARBIDOPA AND LEVODOPA 25; 100 MG/1; MG/1
2 TABLET ORAL
Status: DISCONTINUED | OUTPATIENT
Start: 2024-12-04 | End: 2024-12-04 | Stop reason: HOSPADM

## 2024-12-04 RX ORDER — HYDROXYZINE HYDROCHLORIDE 10 MG/1
10 TABLET, FILM COATED ORAL 3 TIMES DAILY PRN
Status: DISCONTINUED | OUTPATIENT
Start: 2024-12-04 | End: 2024-12-04 | Stop reason: HOSPADM

## 2024-12-04 RX ORDER — CARBIDOPA AND LEVODOPA 25; 100 MG/1; MG/1
1 TABLET, EXTENDED RELEASE ORAL DAILY
Status: DISCONTINUED | OUTPATIENT
Start: 2024-12-04 | End: 2024-12-04 | Stop reason: HOSPADM

## 2024-12-04 RX ORDER — QUETIAPINE FUMARATE 25 MG/1
50 TABLET, FILM COATED ORAL NIGHTLY
Status: DISCONTINUED | OUTPATIENT
Start: 2024-12-04 | End: 2024-12-04 | Stop reason: HOSPADM

## 2024-12-04 ASSESSMENT — COGNITIVE AND FUNCTIONAL STATUS - GENERAL
TURNING FROM BACK TO SIDE WHILE IN FLAT BAD: A LITTLE
CLIMB 3 TO 5 STEPS WITH RAILING: TOTAL
STANDING UP FROM CHAIR USING ARMS: A LOT
MOBILITY SCORE: 14
MOVING TO AND FROM BED TO CHAIR: A LOT
MOVING FROM LYING ON BACK TO SITTING ON SIDE OF FLAT BED WITH BEDRAILS: A LITTLE
WALKING IN HOSPITAL ROOM: A LITTLE

## 2024-12-04 NOTE — CONSULTS
Reason For Consult  L2 compression deformity    History Of Present Illness  Kathy Collins is a 84 y.o. female presenting with frequent falls, found down by aids.     Patient denies any back pain.  Denies weakness/numbness/parasthesias.  Denies bowel/bladder dysfunction.     Past Medical History  She has a past medical history of Basal cell carcinoma of skin, unspecified (08/10/2018), Other conditions influencing health status (08/10/2018), Personal history of other endocrine, nutritional and metabolic disease (08/10/2018), Personal history of other endocrine, nutritional and metabolic disease (08/10/2018), Personal history of other mental and behavioral disorders (08/10/2018), Postural orthostatic tachycardia syndrome (POTS) (08/10/2018), and Sciatica, unspecified side (08/10/2018).    Surgical History  She has a past surgical history that includes Cataract extraction (08/10/2018); Hysterectomy (08/10/2018); and Mastectomy (08/10/2018).     Social History  She reports that she has quit smoking. Her smoking use included cigarettes. She has never used smokeless tobacco. She reports that she does not currently use alcohol. She reports that she does not use drugs.    Family History  No family history on file.     Allergies  Patient has no known allergies.    Review of Systems  10 point ROS is obtained and negative except the ones mentioned in the HPI      Physical Exam  General: asleep but easily awaken, interactive  HEENT: atraumatic, hernandez on   Neuro:  A&Ox3  OU3R, EOMI   Face symmetric, Facial SILT   Tongue midline and symmetric  RUE D5 / B5 / T5 / HG 5/ IO 5  LUE D5 / B5 / T5 / HG 5/ IO 5  RLE HF5 / KE 5/ DF 5/ PF 5  LLE HF5 / KE 5/ DF 5/ PF 5  No clonus, neg Ballesteros  SILT   Cardiac: carotid pulses 2+ b/l  Respiratory: breathing comfortably on RA  Abdomen: non tender, non distended  MSK: ROM WNL for age  Skin: warm, dry    Last Recorded Vitals  Blood pressure 173/75, pulse 70, temperature 36.9 °C (98.5 °F),  temperature source Oral, resp. rate 16, height 1.524 m (5'), weight 54.4 kg (120 lb), SpO2 96%.    Relevant Results  Imaging personally reviewed and is significant for: CT L spine with L2 compression deformity.  Upright L spine xrays non diagnostic.      Assessment/Plan     Kathy Collins is an 84 yr old F with h/o DM2, hypoT, POTS, parkinson's, seizures, CAD s/p CABG (on ASA), anxiety, p/w frequent falls, found down by aids, CT L spine L2 compression deformity, uprights non diagnostic    Patient with lumbar compression deformity without significant retropulsion     Recommendations:  No acute neurosurgical intervention  No spinal precautions needed  Recommend repeat upright L spine xrays as able   AMS workup per ED     Pratibha Marshall MD

## 2024-12-04 NOTE — SIGNIFICANT EVENT
Imaging showing L2 compression fx, uprights obtained and non-diagnostic. Fracture overall is stable. No need for bracing. No need for further imaging or follow up. Pain control per primary team. Encourage working with PTOT. Neurosurgery will sign off at this time please page [phone] with any questions or concerns.

## 2024-12-04 NOTE — PROGRESS NOTES
I assumed care of this patient at 11 PM.    Please see initial provider note for full HPI.  Briefly this is a 84-year-old with history of Parkinson's disease that was noted to be wandering around her nursing facility.  Patient recently had some of her medications changed and family that that this was a result of her medication regimen being subtherapeutic.  Patient does not have a UTI noted on urinalysis along with an associated RUCHI.  Patient had CT that was obtained to evaluate for any type of stone that was negative.  Patient does have an age-indeterminate deformity of the L2 superior endplate.  Neurosurgery spine was consulted and do not have any acute neurosurgical interventions or spinal precautions needed.  Patient will get L-spine upright x-rays when able to and was sent to the CDU for improvement of symptoms on ceftriaxone.  Patient care was overseen by time physician agrees with the plan and disposition.    Mary Ramos MD  Emergency Medicine - PGY3  [facility]  [id number] [facility] [id number]

## 2024-12-04 NOTE — DISCHARGE SUMMARY
Disposition Note  [facility] CLINICAL DECISION    Date of Placement in CDU: 12/4/2024  Time of Disposition: 12:16 PM     Subjective  Kathy Collins is a 84 y.o. female whom has undergone comprehensive diagnostic evaluation and therapeutic management in accordance with the CDU guidelines for RUCHI and UTI. Based on the patient's clinical response and diagnostic information during this period of observation, it has been determined that the patient will be discharged to SNF facility that she was previously admitted to.    Hospital Course  Patient presented to the ED on December 3 with concerns for altered mental status and dehydration. CMP was obtained while in the ED which showed no leukocytosis but did show low hemoglobin of 11.8 however per chart review this is above the patient's baseline.  CMP showed new RUCHI with a BUN of 27, creatinine 1.57, GFR 32.  Chest x-ray showed coarsened interstitial markings correlating with chronic lung disease and no focal consolidation, pleural effusion, pneumothorax.  CT head was obtained for altered mental status which showed no acute intracranial abnormalities or calvarial fracture, diffuse parenchymal volume loss and mild periventricular hypodensity of chronic microvascular ischemia, and left parietal encephalomalacia from prior MCA territory infarct that is unchanged from prior brain MRI.  CT abdomen pelvis showed no acute traumatic injury within the abdomen and pelvis.  CT lumbar spine showed age-indeterminate L2 compression fracture.  Neurosurgery was consulted for the compression fracture and recommended x-ray of the lumbar spine.  Neurosurgery stated the fracture is overall stable therefore there is no need for bracing or further imaging or follow-up.  Neurosurgery signed off.  UA showed UTI with large amount leukocyte esterase and WBC.  Patient was admitted to CDU for RUCHI and UTI with mild altered mental status.  Patient received 1 g Rocephin IV prior to  admission to the CDU.  Patient was started on LR maintenance fluids for RUCHI.  BMP was repeated on December 3 which showed improvement of RUCHI with BUN of 21, creatinine 1.02, GFR 54.  Patient's altered mental status has improved.  Physical therapy evaluated the patient in the ED today and recommended SNF placement.  Patient did present to the ED from a SNF therefore she will be discharged back to the SNF facility that she was previously admitted to.    Physical Exam  Vital signs reviewed and noted no distress. Afebrile.   General: Patient AA&O x 3, well-appearing and well-nourished. No acute distress.   Head: Atraumatic, normocephalic.   EENT: PERRL, EOMI without scleral icterus or conjunctival injection.  Normal phonation without stridor, drooling or trismus.   Neck: Supple, full ROM without meningismus. No lymphadenopathy or nuchal rigidity.   Cardiac: Regular rate and rhythm. No murmur, gallops or rubs.   Pulmonary: Lungs CTAB with good aeration. No respiratory distress or accessory muscle usage. No adventitious breath sounds.    Abdomen: Soft, non-tender, nonsurgical. No masses. No rebound, rigidity or guarding. Normoactive BS   : Deferred.   Musculoskeletal: Full ROM. No pain, joint effusion, or deformity. Pulses full and equal. No cyanosis, clubbing. No pitting edema.   Skin: Warm and intact without rashes, lesions or discoloration. Turgor is good.   Neuro: CN II-IX intact. Alert, A&Ox3. Speech is clear and fluent. No focal deficits noted.   Psychiatric: Mood and affect are appropriate for situation.    Diagnostic Evaluation  Diagnostic studies germane to this period of clinical observation include:   Repeat BMP shows improvement of RUCHI with BUN 21, creatinine 1.02, and GFR 54      Consultant(s) Final Recommendations (as applicable)  Neurosurgery -signed off.  Stated the L2 compression fracture is overall stable therefore no need for racing or further imaging to follow-up.      Impression and Plan  Kathy Bruce  Dennis  has been cared for according to the standard [facility] Clinical Decision Unit observation protocol for RUCHI and UTI. This extended period of observation was specifically required to determine the need for hospitalization. Prior to discharge from observation, the final physical exam is documented above.  Prescription for Keflex for treatment of cystitis has been sent to patient's pharmacy.  Patient was advised to take the medication as prescribed.    Significant events during the course of observation based on the goals of the clinical problem list include:   1) RUCHI improved with fluids  2) improvement of altered mental status    Based on the patient's condition and test results, the patient will be discharged to SNF.    Total length of observation was 11  hours. Dr. Sim is the CDU disposition attending.    The patient will follow up with:  Primary care provider    As appropriate, please see the Exit Care module for comprehensive discharge instructions.    Carter Whitfield PA-C   [facility]  Emergency Department

## 2024-12-04 NOTE — ED PROVIDER NOTES
History of Present Illness     History provided by: Patient and Family Member  Limitations to History: None  External Records Reviewed with Brief Summary:  Paper chart from facility    HPI:  Kathy Collins is a 84 y.o. female past medical history of epilepsy on Keppra hypertension hyperlipidemia parkinsonism frequent UTIs and dementia with sundowning who presents today for concerns for altered mental status. Patient was brought in by her daughter, who states that her facility was concerned that she has been found wandering the halls on different floors within the facility. They state that occasionally she is very confused and appears to be staring off. Patient states that she does not necessarily remember these episodes. She is alert and oriented to self place and time but not day of the week. She denies any abdominal pain shortness of breath or chest pain.  She denies any nausea vomiting diarrhea abdominal pain.  Initially, did deny any falls, however, upon further questioning did endorse that she did have a fall yesterday which she did not tell anyone about. She does endorse mild left flank pain as well as low back pain. She denies any numbness weakness tingling in her arms or legs. She denies any fevers chills or bodyaches.    Physical Exam   Triage vitals:  T 36.9 °C (98.5 °F)  HR 75  /84  RR 16  O2 97 % None (Room air)    Physical Exam  Vitals and nursing note reviewed.   Constitutional:       General: She is not in acute distress.     Appearance: Normal appearance. She is well-developed. She is not ill-appearing or diaphoretic.   HENT:      Head: Normocephalic and atraumatic.      Mouth/Throat:      Pharynx: Oropharynx is clear. No oropharyngeal exudate or posterior oropharyngeal erythema.      Comments: Somewhat dry mucous membranes with crusting around mouth  Eyes:      General: No scleral icterus.     Extraocular Movements: Extraocular movements intact.      Pupils: Pupils are equal, round, and  reactive to light.   Cardiovascular:      Rate and Rhythm: Normal rate and regular rhythm.      Pulses: Normal pulses.      Heart sounds: Normal heart sounds. No murmur heard.     No gallop.   Pulmonary:      Effort: Pulmonary effort is normal. No respiratory distress.      Breath sounds: Normal breath sounds. No stridor. No wheezing, rhonchi or rales.   Abdominal:      General: Bowel sounds are normal. There is no distension.      Palpations: Abdomen is soft. There is no mass.      Hernia: No hernia is present.      Comments: Minimal tenderness to left upper quadrant and left flank without rebound or guarding, no suprapubic left lower quadrant right lower quadrant tenderness   Musculoskeletal:         General: No swelling, deformity or signs of injury. Normal range of motion.      Cervical back: Normal range of motion and neck supple. No tenderness.      Comments: Minimally tender over the lumbar spine without hematoma or ecchymosis   Skin:     General: Skin is warm and dry.      Capillary Refill: Capillary refill takes less than 2 seconds.      Findings: No erythema, lesion or rash.   Neurological:      General: No focal deficit present.      Mental Status: She is alert. Mental status is at baseline.      GCS: GCS eye subscore is 4. GCS verbal subscore is 5. GCS motor subscore is 6.      Cranial Nerves: No cranial nerve deficit, dysarthria or facial asymmetry.   Psychiatric:         Mood and Affect: Mood normal.         Behavior: Behavior normal.          Medical Decision Making & ED Course   Medical Decision Makin y.o. female past medical history of seizure disorder on Keppra hypertension hyperlipidemia parkinsonism frequent UTIs and dementia who presents today for concerns for altered mental status. Patient's symptoms sound most consistent with likely sundowning, however, we will rule out evidence of infection given the fact patient does have frequent UTIs. Patient's urine does demonstrate leuk esterase as  well as elevated white cells without significant epithelial cells. Given this, as well as the fact that she appears to have a RUCHI, we will initiate ceftriaxone as well as give her a liter of fluids. Given fact patient had low back pain as well as left flank pain after a fall, we will also get a CT abdomen pelvis without contrast to assess for possible stone as well as possible traumatic injury of the lumbar spine. Addition, given the fact she did endorse to fall we will get a CT head though she denies losing consciousness, however, may not be a reliable historian.  Patient's imaging was concerning for possible acute versus chronic L2 superior endplate fracture, so patient was seen by neurosurgery, who is on for spine.  They recommended x-rays for evaluation.  Patient will be signed out pending admission to the CDU versus trauma admission given recent fall.  ----      Differential diagnoses considered include but are not limited to: UTI, infected nephrolithiasis, prerenal RUCHI versus obstructive RUCHI, L2 endplate fracture of unknown chronicity     Social Determinants of Health which Significantly Impact Care: None identified     EKG Independent Interpretation: EKG interpreted by myself. Please see ED Course for full interpretation.    Independent Result Review and Interpretation: Relevant laboratory and radiographic results were reviewed and independently interpreted by myself.  As necessary, they are commented on in the ED Course.    Chronic conditions affecting the patient's care: As documented above in [facility]    The patient was discussed with the following consultants/services:  CDU provider regarding possible admission versus trauma admission, neurosurgery for evaluation of spinal fracture.    Care Considerations: As documented above in [facility]    ED Course:  ED Course as of 12/04/24 1348   Tue Dec 03, 2024   2015 Patient reports to me that she fell yesterday and has had back pain since then. This was an unwitnessed fall,  she indicates she fell on her back and has this lower back pain. Does report some dysuria. With this fall, and tenderness diffusely from left CVA to midline L spine, will proceed with CTH, CT A/P, CT L spine, CXR []   2250 CT lumbar spine wo IV contrast  Endplate L2 fracture, concerning for acute after fall yesterday as pain is new []      ED Course User Index  [] Terrell Ernandez MD         Diagnoses as of 12/04/24 1348   Altered mental status, unspecified altered mental status type   Acute cystitis without hematuria     Disposition   Patient was signed out to Dr. Ramos at 2300 pending completion of their work-up.  Please see the next provider's transition of care note for the remainder of the patient's care.     Procedures   Procedures    Patient seen and discussed with ED attending physician.    Carlos Danielle MD  Emergency Medicine       Carlos Danielle MD  Resident  12/04/24 3550       Terrell Ernandez MD  12/05/24 6972

## 2024-12-04 NOTE — PROGRESS NOTES
Kathy Collins is a 84 y.o. female on day 0 of admission presenting with UTI (urinary tract infection).    Assessment/Plan   SW reviewed notes per request of Supervisor Maryjane San. Pt confirmed they had seen Pt and recommended SNF. Spoke with provider and RN. RN confirmed Pt is from facility in [place] [phone]. SW attempted to call facility as Pt discharged and there was no answer. No further needs.     TAZ Vidal

## 2024-12-04 NOTE — PROGRESS NOTES
Physical Therapy    Physical Therapy Evaluation & Treatment    Patient Name: Kathy Collins  MRN: [id number]  Department:   Room: [place] [address]  Today's Date: 12/4/2024   Time Calculation  Start Time: 1018  Stop Time: 1053  Time Calculation (min): 35 min    Assessment/Plan   PT Assessment  PT Assessment Results: Decreased strength, Decreased endurance, Impaired balance, Decreased mobility, Decreased cognition, Impaired sensation, Pain  Rehab Prognosis: Good  Barriers to Discharge: medical status  End of Session Communication: Bedside nurse  Assessment Comment: 85yo female presents with dec strength, balance, endurance, & imp cognition limiting functional mobility. Pt would benefit from continued therapy to address these deficits and improve safety and indep  End of Session Patient Position: Bed, 3 rail up, Alarm off, not on at start of session   IP OR SWING BED PT PLAN  Inpatient or Swing Bed: Inpatient  PT Plan  Treatment/Interventions: Bed mobility, Transfer training, Gait training, Balance training, Strengthening, Endurance training, Therapeutic exercise, Therapeutic activity  PT Plan: Ongoing PT  PT Frequency: 3 times per week  PT Discharge Recommendations: Moderate intensity level of continued care  Equipment Recommended upon Discharge:  (TBD)  PT Recommended Transfer Status: Assist x1  PT - OK to Discharge: Yes (PT eval completed & dc recs made)      Subjective     General Visit Information:  General  Reason for Referral: concerns for AMS/dehydration  Past Medical History Relevant to Rehab: Parkinson's disease, CAD SP CABG 2019, hypothyroidism, HTN, DM2  Family/Caregiver Present: No  Prior to Session Communication: Bedside nurse  Patient Position Received: Bed, 3 rail up, Alarm off, not on at start of session  Preferred Learning Style: auditory, verbal, visual  General Comment: Pt supine in bed upon arrival; cooperative, willing to participate in therapy assessment  Home Living:  Home Living  Home Living  Comments: Pt is a ? historian, but states admitted from SNF; states at baseline she lives in group home  Prior Level of Function:  Prior Function Per Pt/Caregiver Report  Prior Function Comments: Pt is a ? historian , but states able to amb with WW and assist, dress indep, and requires assist to bathe.  Reports h/o falls  Precautions:  Precautions  Medical Precautions: Fall precautions  Precautions Comment: h/o falls    Vital Signs     Vital Signs Comment: HR 80, O2 sats 96% during session    Objective   Pain:  Pain Assessment  0-10 (Numeric) Pain Score:  (Pt reporting L knee pain, not rated, worse with flexion)  Cognition:  Cognition  Overall Cognitive Status:  (intermittent confusion, poor historian)  Insight: Mild    General Assessments:     Activity Tolerance  Endurance: Tolerates 10 - 20 min exercise with multiple rests    Sensation  Sensation Comment: B feet with neuropathy    Strength  Strength Comments: generalized weakness throughout  Strength  Strength Comments: generalized weakness throughout    Perception  Inattention/Neglect: Appears intact  Initiation: Appears intact  Motor Planning: Appears intact  Perseveration: Not present    Coordination  Movements are Fluid and Coordinated: No  Coordination Comment: BUE tremors L>R    Postural Control  Postural Control: Within Functional Limits    Static Sitting Balance  Static Sitting-Balance Support: Feet supported, Bilateral upper extremity supported  Static Sitting-Level of Assistance: Close supervision    Static Standing Balance  Static Standing-Balance Support: Bilateral upper extremity supported  Static Standing-Level of Assistance: Minimum assistance  Dynamic Standing Balance  Dynamic Standing-Balance Support: Bilateral upper extremity supported  Dynamic Standing-Level of Assistance: Minimum assistance  Dynamic Standing-Balance: Turning  Functional Assessments:  Bed Mobility  Bed Mobility: Yes  Bed Mobility 1  Bed Mobility 1: Supine to sitting, Sitting to  supine  Level of Assistance 1: Minimum assistance    Transfers  Transfer: Yes  Transfer 1  Technique 1: Sit to stand, Stand to sit  Transfer Device 1: Walker  Transfer Level of Assistance 1: Moderate assistance  Trials/Comments 1: x4 trials    Ambulation/Gait Training  Ambulation/Gait Training Performed: Yes  Ambulation/Gait Training 1  Surface 1: Level tile  Device 1: Rolling walker  Assistance 1: Minimum assistance, Moderate verbal cues  Quality of Gait 1: Decreased step length, Inconsistent stride length, Shuffling gait, Soft knee(s)  Comments/Distance (ft) 1: 15'    Stairs  Stairs: No  Extremity/Trunk Assessments:  RUE   RUE : Within Functional Limits  LUE   LUE: Within Functional Limits  RLE   RLE : Within Functional Limits  LLE   LLE : Within Functional Limits  Treatments:  Therapeutic Activity  Therapeutic Activity Performed: Yes  Therapeutic Activity 1: Pt completes STS transfer x 4 trials with/without WW with modA for strengthening in functional pattern.  Sitting balance at EOB x ~10 min with close SBA while using bedpan.  Outcome Measures:  [facility] Basic Mobility  Turning from your back to your side while in a flat bed without using bedrails: A little  Moving from lying on your back to sitting on the side of a flat bed without using bedrails: A little  Moving to and from bed to chair (including a wheelchair): A lot  Standing up from a chair using your arms (e.g. wheelchair or bedside chair): A lot  To walk in hospital room: A little  Climbing 3-5 steps with railing: Total  Basic Mobility - Total Score: 14    Encounter Problems       Encounter Problems (Active)       Mobility       STG - Patient will ambulate >75' with WW and CGA (Progressing)       Start:  12/04/24    Expected End:  12/18/24               PT Transfers       STG - Patient to transfer to and from sit to supine indep from flat bed, no rails  (Progressing)       Start:  12/04/24    Expected End:  12/18/24            STG - Patient will transfer  sit to and from stand with CGA and WW (Progressing)       Start:  12/04/24    Expected End:  12/18/24                   Education Documentation  Precautions, taught by Bhavna Schrader, PT at 12/4/2024  3:59 PM.  Learner: Patient  Readiness: Acceptance  Method: Explanation  Response: Verbalizes Understanding, Needs Reinforcement  Comment: safety with gait    Mobility Training, taught by Bhavna Schrader, PT at 12/4/2024  3:59 PM.  Learner: Patient  Readiness: Acceptance  Method: Explanation  Response: Verbalizes Understanding, Needs Reinforcement  Comment: safety with gait    Education Comments  No comments found.      12/04/24 at 4:00 PM - Bhavna Schrader, PT

## 2024-12-04 NOTE — DISCHARGE INSTRUCTIONS
Please follow-up with your primary care provider.  Prescription for Keflex for treatment of UTI has been sent to your pharmacy.  Please take medication as prescribed.  Return to the emergency department if your symptoms persist or worsen.

## 2024-12-04 NOTE — H&P
CDU  History & Physical    Date of Placement in CDU: 12/4  Time Placed in Observation: 0300    Patient History  79-year-old female with history of Parkinson's disease, CAD SP CABG 2019, hypothyroidism, HTN, DM2 who presented to the emergency department from nursing facility with concerns for AMS/dehydration.  Was recently placed in SNF, reportedly was found wandering around the SNF with staring episodes.  Reportedly she fell at some point, unwitnessed.  Presented alert and oriented x 3 with mild confusion. The acute evaluation included laboratory work with no leukocytosis, mild anemia with H&H 11.8/35.4, Creatinine 1.57 with GFR of 32, most recent on record with creatinine 0.684 years ago, urinalysis indicative of UTI with large amount leukocyte esterase and white blood cells.  Imaging remarkable for age indeterminant L2 compression fracture.  Neurosurgery consulted recommended plain film x-ray.  They are signing off as no indication for further workup. She received 1 g of Rocephin IV, admitted to CDU for further management of her RUCHI in the setting of UTI with mild AMS.    Upon admission to the Clinical Decision Unit, she is resting in bed comfortably, states she feels well    Past Medical History: Reviewed, see EMR and HPI  Past Surgical History: Reviewed, see EMR  Social History: Denies alcohol tobacco or drug use      Medications  Reviewed, see EMR      Review of Systems  Denies fevers, chills, night sweats rigors, chest pain, abdominal pain, nausea, vomiting, weakness, paresthesias, ataxia, headache, lightheadedness, dizziness, syncope, near syncope, blurry vision, change in bowel or bladder habits, dysuria, frequency, urgency.      Physical Exam:   GENERAL.: Vitals noted. No distress.  Normocephalic atraumatic.   EYES:  PERRLA, EOMs intact  OROPHARYNX:  No erythema or exudate.  Mucosa moist.  NECK: Supple. No adenopathy.  CARDIAC: Regular rate rhythm. No murmur noted.  PULMONARY: Equal breath sounds  bilaterally.  No wheezes rales or rhonchi  ABDOMEN: Soft, nontender, nonsurgical. No guarding. Normoactive bowel sounds. No bruits, no masses  EXTREMITIES: Full ROM, no pitting edema,   SKIN: Intact, warm and dry  NEURO: Alert and oriented x 3, speech is clear, no obvious deficits noted.  Cranial nerves intact, intact sensation/strength all 4 extremities    Diagnostic Evaluation  Diagnostic studies and ED interventions germane to this period of clinical observation will include:   IV Rocephin, IVF    Impression and Plan  In summary, Mrs Collins is admitted to the [facility] Clinical Decision Unit for UTI with RUCHI. Dr. Ramires is the CDU admission attending.    This patient has been risk-stratified based on available history, physical exam, and related study findings. Admission to the observation status for further diagnosis/treatment/monitoring of this patient is warranted clinically. This extended period of observation is specifically required to determine the need for hospitalization.     The goals of this admission based on the patient´s clinical problem list are:  1) UTI  - SP Rocephin 1 g IV in ED  - Continue Rocephin daily  2) RUCHI  - Unclear baseline as most recent 4 years ago  - Encourage oral hydration  - IV fluids LR  3) AMS  - Most likely from comminution of UTI with RUCHI, very mild if at all present currently  - Will continue to monitor with neurochecks  4) fall  - Most likely in the setting of UTI/RUCHI with AMS  - Age-indeterminate L2 compression fracture  - Seen by neurosurgery, signed off    When met, appropriate disposition will be arranged  Wiliam Easley PA-C

## 2024-12-05 ENCOUNTER — TELEMEDICINE (OUTPATIENT)
Dept: BEHAVIORAL HEALTH | Facility: CLINIC | Age: 84
End: 2024-12-05
Payer: MEDICARE

## 2024-12-05 DIAGNOSIS — R41.0 DELIRIUM: ICD-10-CM

## 2024-12-05 DIAGNOSIS — F41.1 GAD (GENERALIZED ANXIETY DISORDER): ICD-10-CM

## 2024-12-05 DIAGNOSIS — F32.89 OTHER DEPRESSION: ICD-10-CM

## 2024-12-05 PROCEDURE — 1157F ADVNC CARE PLAN IN RCRD: CPT | Performed by: PSYCHIATRY & NEUROLOGY

## 2024-12-05 PROCEDURE — 99213 OFFICE O/P EST LOW 20 MIN: CPT | Performed by: PSYCHIATRY & NEUROLOGY

## 2024-12-05 PROCEDURE — 1159F MED LIST DOCD IN RCRD: CPT | Performed by: PSYCHIATRY & NEUROLOGY

## 2024-12-05 PROCEDURE — 1160F RVW MEDS BY RX/DR IN RCRD: CPT | Performed by: PSYCHIATRY & NEUROLOGY

## 2024-12-05 NOTE — PATIENT INSTRUCTIONS
Plan:   - Continue quetiapine 50mg at bedtime. Monitor for any worsening of mental status. Monitor for side effects, including sedation and impaired balance.   - Continue lorazepam 1mg in the morning and 1mg at around 2pm.   - Continue buspar 10mg three times daily.   - Continue sertraline 75mg once daily.   - Continue lamictal 200mg twice daily (as prescribed by PCP).   - Limit use of hydroxyzine as much as possible.   - Make sure you are eating and drinking adequately.   - Appropriate pain management.   - If you notice any sudden changes in mental status, it may be due to underlying medical issues like urine infection or dehydration. Please seek urgent medical attention if this happens.  - Follow up in about 2months.  - Contact via [employer] or call sooner ([phone]) in case of any questions or concerns.  - Call [phone] for mental health crisis or suicidal thoughts, or call 911 or go to the nearest emergency room for emergencies.

## 2024-12-05 NOTE — PROGRESS NOTES
"Outpatient Psychiatry Follow up visit    Ms. Kathy Collins is an 84 year old woman with a history of generalized anxiety disorder, mood disorder, cognitive impairment, essential tremors and Parkinson's disease. Follow up done virtually today.   Seen with daughter Ruth.     Virtual or Telephone Consent    An interactive audio and video telecommunication system which permits real time communications between the patient (at the originating site) and provider (at the distant site) was utilized to provide this telehealth service.   Verbal consent was requested and obtained from Kathy Collins on this date, 12/05/24 for a telehealth visit.     Subjective:     She says she is feeling \"pretty good.\"   She says \"its snowing bad here.\"   She says \"I'm too confused.\"     She was admitted to the [facility] a few weeks ago - her 3 medications (sinemet ER, buspirone, quetiapine) were stopped abruptly. She has now been restarted on buspirone but not on quetiapine.     She was also in the ED for altered mental status on 12/3/24 - she was very confused, hallucinating (seeing cats and dogs), delusional (daughter, nurses having parties); she had \"vacant stares;\" could not feed or clothe herself. She was found to have UTI, impaired renal function, and back fractures.      She is back in assisted living ([facility]).   She is now able to take care of her own ADLs.       The nurses administer the medications.        She is very easily fatigued.   She is eating okay.   Sleep - says it is good.  Denies any death wishes.  Denies any suicidal thoughts, intent or plans.      Her anxiety is \"off the charts.\"     She is doing PT.      Current medication list reviewed with staff nurse:   Quetiapine 50mg at bedtime (was discontinued a few weeks ago but has been restarted)  Sertraline 75mg once daily  Buspar 10mg three times daily (was discontinued and restarted at lower dose)  Lamotrigine 200mg twice daily " "(also for seizures).  Ativan 1mg in the morning and 1mg around 4pm.   Memantine 10mg daily  Hydroxyzine 10mg three times daily as needed for anxiety    Psychiatric Review Of Systems:   As above.     Medical Review Of Systems:    Pertinent items are noted in HPI.    Record Review: moderate       Mental Status Evaluation:  Appearance: Appears to be stated age. Fair grooming.   Behavior/Attitude: Cooperative. Pleasant.   Speech: Soft.   Mood: \"pretty good\"  Affect: Congruent to stated mood. Mobilized appropriately. In no apparent distress.   Thought process: Fairly goal-directed.   Thought content: Recent delusional thinking and increased hallucinations, likely due to delirium; did not appear internally stimulated or delusional during visit.   Suicidal ideation: denied.  Homicidal ideation: denied.   Insight: Limited  Judgment: Impaired   Recent and remote memory: impaired.   Attention/concentration: intact during appointment.   Language: No aphasia or paraphasic errors. Had word-finding difficulties.   Fund of knowledge: Average.     PMH/PSH:  Past Medical History:   Diagnosis Date    Basal cell carcinoma of skin, unspecified 08/10/2018    Skin cancer, basal cell    Other conditions influencing health status 08/10/2018    Chronic migraine    Personal history of other endocrine, nutritional and metabolic disease 08/10/2018    History of hypothyroidism    Personal history of other endocrine, nutritional and metabolic disease 08/10/2018    History of type 2 diabetes mellitus    Personal history of other mental and behavioral disorders 08/10/2018    History of post traumatic stress disorder    Postural orthostatic tachycardia syndrome (POTS) 08/10/2018    POTS (postural orthostatic tachycardia syndrome)    Sciatica, unspecified side 08/10/2018    Sciatic pain        Meds  Current Outpatient Medications on File Prior to Visit   Medication Sig Dispense Refill    aspirin-calcium carbonate 81 mg-300 mg calcium(777 mg) tablet " Take 1 tablet by mouth once daily.      B complex-vitamin C-folic acid (Nephro-[facility]) 1- mg-mg-mcg tablet Take 1 tablet by mouth once daily with breakfast.      busPIRone (Buspar) 15 mg tablet TAKE 1 TABLET (15MG) BY MOUTH 3 TIMES DAILY AND TAKE 1/2 TABLET (7.5MG) BY MOUTH AT BEDTIME      carbidopa-levodopa (Sinemet CR)  mg ER tablet TAKE ONE(1) TABLET BY MOUTH ONCE DAILY **DO NOT CRUSH**      carbidopa-levodopa (Sinemet)  mg tablet MAKE 2 TABLETS BY MOUTH DAILY AT 8AM AND NOON, TAKE 1.5 TABS DAILY AT 4PM AND 1 TAB DAILY AT 8PM **STO      cephalexin (Keflex) 500 mg capsule Take 1 capsule (500 mg) by mouth 3 times a day for 7 days. 21 capsule 0    cholecalciferol (Vitamin D-3) 5,000 Units tablet Take 1 tablet (5,000 Units) by mouth once daily.      cyanocobalamin (Vitamin B-12) 1,000 mcg tablet Take 1 tablet (1,000 mcg) by mouth once daily.      docusate sodium (Colace) 100 mg capsule Take 1 capsule (100 mg) by mouth 2 times a day.      Eliquis 2.5 mg tablet Take 1 tablet (2.5 mg) by mouth 2 times a day.      ezetimibe-simvastatin (Vytorin) 10-40 mg tablet TAKE ONE(1) TABLET BY MOUTH ONCE DAILY      fluticasone (Flonase) 50 mcg/actuation nasal spray USE 1 SPRAY INTO EACH NOSTRIL TWICE DAILY      folic acid (Folvite) 400 mcg tablet Take by mouth once daily.      hydrOXYzine HCL (Atarax) 10 mg tablet Take 1 tablet (10 mg) by mouth 3 times a day as needed for anxiety.      Januvia 100 mg tablet Take 1 tablet (100 mg) by mouth once daily.      lamoTRIgine (LaMICtal) 200 mg tablet Take 1 tablet (200 mg) by mouth 2 times a day.      levothyroxine (Synthroid, Levoxyl) 75 mcg tablet Take 1 tablet (75 mcg) by mouth once daily.      LORazepam (Ativan) 1 mg tablet 1 tablet (1 mg) 2 times a day.      megestrol 400 mg/10 mL (40 mg/mL) suspension Take by mouth once daily. Shake well just before you measure a dose. Measure with a special dose-measuring spoon or medicine cup, not with a regular table spoon. If  you do not have a dose-measuring device, ask your pharmacist for one.      memantine (Namenda) 10 mg tablet Take 1 tablet (10 mg) by mouth once daily.      metoprolol tartrate (Lopressor) 25 mg tablet Take 1 tablet (25 mg) by mouth every 12 hours.      ondansetron ODT (Zofran-ODT) 4 mg disintegrating tablet DISSOLVE 1 TABLET S/L EVERY 6 HOURS FOR NAUSEA.      polyethylene glycol (Glycolax, Miralax) 17 gram packet Take 17 g by mouth once daily.      QUEtiapine (SEROquel) 50 mg tablet TAKE ONE(1) TABLET BY MOUTH ONCE DAILY      sertraline (Zoloft) 50 mg tablet Take 1.5 tablets (75 mg) by mouth once daily.      sulfamethoxazole-trimethoprim (Bactrim) 400-80 mg tablet Take 1 tablet by mouth 2 times a day.       Current Facility-Administered Medications on File Prior to Visit   Medication Dose Route Frequency Provider Last Rate Last Admin    [DISCONTINUED] apixaban (Eliquis) tablet 2.5 mg  2.5 mg oral q12h Wiliam Easley PA-C        [DISCONTINUED] busPIRone (Buspar) tablet 15 mg  15 mg oral TID Wiliam Easley PA-C   15 mg at 12/04/24 1026    [DISCONTINUED] carbidopa-levodopa (Sinemet CR)  mg ER tablet 1 tablet  1 tablet oral Daily Wiliam Easley PA-C   1 tablet at 12/04/24 1029    [DISCONTINUED] carbidopa-levodopa (Sinemet)  mg per tablet 1 tablet  1 tablet oral Daily Wiliam Easley PA-C        [DISCONTINUED] carbidopa-levodopa (Sinemet)  mg per tablet 1.5 tablet  1.5 tablet oral Daily Wiliam Easley PA-C        [DISCONTINUED] carbidopa-levodopa (Sinemet)  mg per tablet 2 tablet  2 tablet oral 2 times per day Wiliam Easley PA-C   2 tablet at 12/04/24 1315    [DISCONTINUED] cefTRIAXone (Rocephin) 1 g in dextrose (iso) IV 50 mL  1 g intravenous q24h Wiliam Easley PA-C   Stopped at 12/04/24 0601    [DISCONTINUED] docusate sodium (Colace) capsule 100 mg  100 mg oral BID Wiliam Easley PA-C   100 mg at 12/04/24 0991    [DISCONTINUED] hydrOXYzine HCL (Atarax) tablet 10 mg  10  mg oral TID PRN Wiliam Easley PA-C   10 mg at 12/04/24 1028    [DISCONTINUED] lactated Ringer's infusion  125 mL/hr intravenous Continuous Wiliam Easley PA-C   Stopped at 12/04/24 1540    [DISCONTINUED] lamoTRIgine (LaMICtal) tablet 200 mg  200 mg oral BID Wiliam Easley PA-C   200 mg at 12/04/24 0950    [DISCONTINUED] levothyroxine (Synthroid, Levoxyl) tablet 75 mcg  75 mcg oral Daily Wiliam Easley PA-C   75 mcg at 12/04/24 0950    [DISCONTINUED] memantine (Namenda) tablet 10 mg  10 mg oral Nightly Wiliam Easley PA-C        [DISCONTINUED] metoprolol tartrate (Lopressor) tablet 25 mg  25 mg oral q12h Wiliam Easley PA-C        [DISCONTINUED] QUEtiapine (SEROquel) tablet 50 mg  50 mg oral Nightly Wiliam Easley PA-C        [DISCONTINUED] sertraline (Zoloft) tablet 75 mg  75 mg oral Daily Wiliam Easley PA-C   75 mg at 12/04/24 0950    [DISCONTINUED] SITagliptin phosphate (Januvia) tablet 25 mg  25 mg oral Daily Wiliam Easley PA-C   25 mg at 12/04/24 1027        Allergies:   No Known Allergies      Assessment/Plan   Assessment:   Ms. Kathy Collins is an 84 year old woman with a history of generalized anxiety disorder, mood disorder, cognitive impairment, essential tremors and Parkinson's disease. Follow up done virtually today.      12/4/24 - She was in hospital recently due to altered mental status, likely secondary to multiple factors.    Diagnosis:   Generalized anxiety disorder  Mood disorder (?bipolar disorder)   Visual hallucinations  Cognitive impairment - likely multifactorial.  History of PTSD    Treatment Plan/Recommendations:   - No medication changes. Mental status should improve as her UTI is treated. Discussed importance of adequate hydration and nutrition, and pain management.   - Continue quetiapine 50mg at bedtime. Monitor for any worsening of mental status. Discussed potential side effects of sedation and impaired balance.   - Continue lorazepam 1mg in the morning  and 1mg at around 2pm.   - Limit use of hydroxyzine as much as possible.   - Continue buspar 10mg three times daily.   - Continue sertraline 75mg once daily.   - Continue lamictal 200mg twice daily (as prescribed by PCP).   - Educated on delirium.   - Follow up in about 2months.      Darling Christianson MD.

## 2024-12-06 ENCOUNTER — TELEMEDICINE (OUTPATIENT)
Dept: NEUROLOGY | Facility: CLINIC | Age: 84
End: 2024-12-06
Payer: MEDICARE

## 2024-12-06 VITALS — WEIGHT: 120 LBS | BODY MASS INDEX: 23.56 KG/M2 | HEIGHT: 60 IN

## 2024-12-06 DIAGNOSIS — G20.A1 PARKINSON'S DISEASE WITHOUT DYSKINESIA, UNSPECIFIED WHETHER MANIFESTATIONS FLUCTUATE: Primary | ICD-10-CM

## 2024-12-06 DIAGNOSIS — F02.811 DEMENTIA DUE TO PARKINSON'S DISEASE, WITH AGITATION, UNSPECIFIED DEMENTIA SEVERITY (MULTI): ICD-10-CM

## 2024-12-06 DIAGNOSIS — G20.A1 DEMENTIA DUE TO PARKINSON'S DISEASE, WITH AGITATION, UNSPECIFIED DEMENTIA SEVERITY (MULTI): ICD-10-CM

## 2024-12-06 DIAGNOSIS — G40.909 SEIZURE DISORDER (MULTI): ICD-10-CM

## 2024-12-06 DIAGNOSIS — G25.0 ESSENTIAL TREMOR: ICD-10-CM

## 2024-12-06 DIAGNOSIS — Z86.73 HISTORY OF STROKE: ICD-10-CM

## 2024-12-06 PROCEDURE — 99213 OFFICE O/P EST LOW 20 MIN: CPT | Performed by: PSYCHIATRY & NEUROLOGY

## 2024-12-06 PROCEDURE — 1125F AMNT PAIN NOTED PAIN PRSNT: CPT | Performed by: PSYCHIATRY & NEUROLOGY

## 2024-12-06 PROCEDURE — 1157F ADVNC CARE PLAN IN RCRD: CPT | Performed by: PSYCHIATRY & NEUROLOGY

## 2024-12-06 PROCEDURE — 1036F TOBACCO NON-USER: CPT | Performed by: PSYCHIATRY & NEUROLOGY

## 2024-12-06 PROCEDURE — 1160F RVW MEDS BY RX/DR IN RCRD: CPT | Performed by: PSYCHIATRY & NEUROLOGY

## 2024-12-06 PROCEDURE — G2211 COMPLEX E/M VISIT ADD ON: HCPCS | Performed by: PSYCHIATRY & NEUROLOGY

## 2024-12-06 PROCEDURE — 1159F MED LIST DOCD IN RCRD: CPT | Performed by: PSYCHIATRY & NEUROLOGY

## 2024-12-06 RX ORDER — CLOTRIMAZOLE AND BETAMETHASONE DIPROPIONATE 10; .64 MG/G; MG/G
CREAM TOPICAL 2 TIMES DAILY
COMMUNITY
Start: 2024-10-06

## 2024-12-06 RX ORDER — CARBIDOPA AND LEVODOPA 25; 100 MG/1; MG/1
TABLET ORAL
Qty: 270 TABLET | Refills: 3 | Status: SHIPPED | OUTPATIENT
Start: 2024-12-06

## 2024-12-06 RX ORDER — ASPIRIN 81 MG/1
81 TABLET ORAL DAILY
COMMUNITY

## 2024-12-06 ASSESSMENT — ENCOUNTER SYMPTOMS
DEPRESSION: 0
LOSS OF SENSATION IN FEET: 1
TREMORS: 1
OCCASIONAL FEELINGS OF UNSTEADINESS: 1

## 2024-12-06 ASSESSMENT — PAIN SCALES - GENERAL: PAINLEVEL_OUTOF10: 8
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
## 2024-12-06 NOTE — PATIENT INSTRUCTIONS
"It was a pleasure seeing you today.     FAX summary to [phone]    Give it another 2 weeks, if still very confused/tired in the day time Consider lowering Sinemet 25-100mg 1 tab TID .    Consider palliative care/hospice care.     Please make a follow up appointment in 3-4 months or sooner if needed.    For any urgent issues or needing to speak to a medical assistant please call [phone], option 6 during our office hours Monday-Friday 8am-4pm, and leave a voicemail with your concern.  My office will try to reach back you as soon as possible within 24 (business) hours.  If you have an emergency please call 911 or visit a local urgent care or nearest emergency room.      Please understand that [employer] is a useful communication tool for simple \"normal\" results or a refill request but I would not recommend using this tool for emergent or urgent issues or for conversations with me.  I am happy to ask my staff to rearrange a follow up visit or a virtual visit sooner than requested if appropriate for your care.    "

## 2025-01-17 ENCOUNTER — APPOINTMENT (OUTPATIENT)
Dept: NEUROLOGY | Facility: CLINIC | Age: 85
End: 2025-01-17
Payer: MEDICARE

## 2025-01-24 ENCOUNTER — APPOINTMENT (OUTPATIENT)
Dept: NEUROLOGY | Facility: CLINIC | Age: 85
End: 2025-01-24
Payer: MEDICARE

## 2025-02-04 ENCOUNTER — APPOINTMENT (OUTPATIENT)
Dept: BEHAVIORAL HEALTH | Facility: CLINIC | Age: 85
End: 2025-02-04
Payer: MEDICARE

## 2025-02-04 DIAGNOSIS — F32.89 OTHER DEPRESSION: ICD-10-CM

## 2025-02-04 DIAGNOSIS — R41.0 DELIRIUM: ICD-10-CM

## 2025-02-04 DIAGNOSIS — R44.1 VISUAL HALLUCINATION: ICD-10-CM

## 2025-02-04 DIAGNOSIS — F41.1 GAD (GENERALIZED ANXIETY DISORDER): ICD-10-CM

## 2025-02-04 PROCEDURE — 1160F RVW MEDS BY RX/DR IN RCRD: CPT | Performed by: PSYCHIATRY & NEUROLOGY

## 2025-02-04 PROCEDURE — 1157F ADVNC CARE PLAN IN RCRD: CPT | Performed by: PSYCHIATRY & NEUROLOGY

## 2025-02-04 PROCEDURE — 1159F MED LIST DOCD IN RCRD: CPT | Performed by: PSYCHIATRY & NEUROLOGY

## 2025-02-04 PROCEDURE — 99213 OFFICE O/P EST LOW 20 MIN: CPT | Performed by: PSYCHIATRY & NEUROLOGY

## 2025-02-04 RX ORDER — HYDROXYZINE HYDROCHLORIDE 10 MG/1
10 TABLET, FILM COATED ORAL 2 TIMES DAILY
COMMUNITY

## 2025-02-04 NOTE — PATIENT INSTRUCTIONS
Plan:   - Continue quetiapine 50mg at bedtime.   - Continue lorazepam 1mg in the morning and 1mg at around 2pm. Discussed the potential negative effects of long-term use.  - Continue hydroxyzine 10mg twice daily and 10mg as needed; limit use of hydroxyzine as much as possible.   - Continue buspar 10mg three times daily.   - Continue sertraline 75mg once daily.   - Continue lamictal 200mg twice daily (as prescribed by PCP).   - If you notice any sudden changes in mental status, it may be due to underlying medical issues like urine infection or dehydration. Please seek urgent medical attention if this happens.  - Follow up in about 3-4 months. Call [phone] to schedule.   - Contact via [facility] or call sooner ([phone]) in case of any questions or concerns.  - Call [phone] for mental health crisis or suicidal thoughts, or call [phone] or go to the nearest emergency room for emergencies.
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
## 2025-02-21 ENCOUNTER — APPOINTMENT (OUTPATIENT)
Dept: NEUROLOGY | Facility: CLINIC | Age: 85
End: 2025-02-21
Payer: MEDICARE

## 2025-06-02 ENCOUNTER — APPOINTMENT (OUTPATIENT)
Facility: CLINIC | Age: 85
End: 2025-06-02
Payer: MEDICARE

## 2025-06-02 VITALS
HEIGHT: 64 IN | HEART RATE: 63 BPM | WEIGHT: 135 LBS | SYSTOLIC BLOOD PRESSURE: 172 MMHG | DIASTOLIC BLOOD PRESSURE: 73 MMHG | BODY MASS INDEX: 23.05 KG/M2

## 2025-06-02 DIAGNOSIS — G25.0 ESSENTIAL TREMOR: ICD-10-CM

## 2025-06-02 DIAGNOSIS — G40.909 SEIZURE DISORDER (MULTI): ICD-10-CM

## 2025-06-02 DIAGNOSIS — Z86.73 HISTORY OF STROKE: ICD-10-CM

## 2025-06-02 DIAGNOSIS — G20.A2 PARKINSON'S DISEASE WITHOUT DYSKINESIA, WITH FLUCTUATING MANIFESTATIONS: Primary | ICD-10-CM

## 2025-06-02 PROCEDURE — G8433 SCR FOR DEP NOT CPT DOC RSN: HCPCS | Performed by: PSYCHIATRY & NEUROLOGY

## 2025-06-02 PROCEDURE — 1160F RVW MEDS BY RX/DR IN RCRD: CPT | Performed by: PSYCHIATRY & NEUROLOGY

## 2025-06-02 PROCEDURE — 1036F TOBACCO NON-USER: CPT | Performed by: PSYCHIATRY & NEUROLOGY

## 2025-06-02 PROCEDURE — 1126F AMNT PAIN NOTED NONE PRSNT: CPT | Performed by: PSYCHIATRY & NEUROLOGY

## 2025-06-02 PROCEDURE — 99214 OFFICE O/P EST MOD 30 MIN: CPT | Performed by: PSYCHIATRY & NEUROLOGY

## 2025-06-02 PROCEDURE — G2211 COMPLEX E/M VISIT ADD ON: HCPCS | Performed by: PSYCHIATRY & NEUROLOGY

## 2025-06-02 PROCEDURE — 1159F MED LIST DOCD IN RCRD: CPT | Performed by: PSYCHIATRY & NEUROLOGY

## 2025-06-02 RX ORDER — ACETAMINOPHEN 500 MG/1
CAPSULE, LIQUID FILLED ORAL EVERY 6 HOURS PRN
COMMUNITY

## 2025-06-02 RX ORDER — LOSARTAN POTASSIUM 50 MG/1
1 TABLET ORAL
COMMUNITY
Start: 2025-05-26

## 2025-06-02 RX ORDER — IBUPROFEN 200 MG
TABLET ORAL AS NEEDED
COMMUNITY

## 2025-06-02 RX ORDER — BISMUTH SUBSALICYLATE 262 MG
1 TABLET,CHEWABLE ORAL DAILY
COMMUNITY

## 2025-06-02 ASSESSMENT — PAIN SCALES - GENERAL: PAINLEVEL_OUTOF10: 0-NO PAIN

## 2025-06-02 ASSESSMENT — ENCOUNTER SYMPTOMS
OCCASIONAL FEELINGS OF UNSTEADINESS: 1
DEPRESSION: 0
LOSS OF SENSATION IN FEET: 0

## 2025-06-02 NOTE — PATIENT INSTRUCTIONS
"It was a pleasure seeing you today.     Please make a follow up appointment in 6 months.  You may also schedule a phone or virtual visit sooner on a Friday morning with me as needed before the next clinic appointment.     For any urgent issues or needing to speak to a medical assistant please call [phone], option 6 during our office hours Monday-Friday 8am-4pm, and leave a voicemail with your concern.  My office will try to reach back you as soon as possible within 24 (business) hours.  If you have an emergency please call 911 or visit a local urgent care or nearest emergency room.      Please understand that [employer] is a useful communication tool for simple \"normal\" results or a refill request but I would not recommend using this tool for emergent or urgent issues or for conversations with me.  I am happy to ask my staff to rearrange a follow up visit or a virtual visit sooner than requested if appropriate for your care.    "

## 2025-06-02 NOTE — PROGRESS NOTES
"Subjective     Kathy Collins is a 84 y.o. year old female here for follow-up for Parkinson's, tremors and seizure.    HPI    She is with her granddaughter today.  She still does get anxious, feels her communication skills are not as good.   She was started on memantine.   She is doing well.  She fell when she had a UTI.  She saw psychiatry, Dr. Christianson, in February and no change in her meds at that time.   No longer on oxygen.   Not having headaches.   in the past she tried Rivastigmine patch for memory issues - could not tolerate it- she was on the patch for one night and she had a seizure.      March 25 2020- admission to EMU- on Keppra and VPA, continued to seizure, transferred to [facility] - loaded with fosphenytoin then VPA d/c'ed due to hyperammonemia. Lamictal was increased to 100mg BID and her Keppra and phenytoin were weaned off. last seizure recorded was 3/15.   EEG on video showed \"bilateral arm myoclonic seizure , R head versive seizure then b/l asymmetric clonic. Dr. Donaldson felt that she was close to dying during that hospitalization and she recovered rather well.      Tried TPX for HA but that caused more confusion.   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    prior to her seizure she gets a vertigo sensation, feels she cannot move.     Current PD meds:    Sinemet 1 tab TID      hx:   She was diagnosed in August 2016- with PD, hx of tremor since her 20s. She thinks she has had some PD symptoms for a \"long time\"   Review of Systems    Patient Active Problem List   Diagnosis    UTI (urinary tract infection)     Past Medical History:   Diagnosis Date    Basal cell carcinoma of skin, unspecified 08/10/2018    Skin cancer, basal cell    Other conditions influencing health status 08/10/2018    Chronic migraine    Personal history of other endocrine, nutritional and metabolic disease 08/10/2018    History of hypothyroidism    Personal history of other endocrine, " nutritional and metabolic disease 08/10/2018    History of type 2 diabetes mellitus    Personal history of other mental and behavioral disorders 08/10/2018    History of post traumatic stress disorder    Postural orthostatic tachycardia syndrome (POTS) 08/10/2018    POTS (postural orthostatic tachycardia syndrome)    Sciatica, unspecified side 08/10/2018    Sciatic pain     Past Surgical History:   Procedure Laterality Date    CATARACT EXTRACTION  08/10/2018    Cataract Surgery    HYSTERECTOMY  08/10/2018    Hysterectomy    MASTECTOMY  08/10/2018    Breast Surgery Mastectomy     Social History     Tobacco Use    Smoking status: Former     Types: Cigarettes    Smokeless tobacco: Never   Substance Use Topics    Alcohol use: Not Currently     family history is not on file.    Current Outpatient Medications:     aspirin (Corbin Low Dose Aspirin) 81 mg EC tablet, Take 1 tablet (81 mg) by mouth once daily., Disp: , Rfl:     busPIRone (Buspar) 10 mg tablet, Take 1 tablet (10 mg) by mouth 3 times a day., Disp: , Rfl:     carbidopa-levodopa (Sinemet CR)  mg ER tablet, , Disp: , Rfl:     carbidopa-levodopa (Sinemet)  mg tablet, Take 1 tab TID, Disp: 270 tablet, Rfl: 3    cholecalciferol (Vitamin D-3) 5,000 Units tablet, Take 1 tablet (5,000 Units) by mouth once daily., Disp: , Rfl:     clotrimazole-betamethasone (Lotrisone) cream, 2 times a day., Disp: , Rfl:     cyanocobalamin (Vitamin B-12) 1,000 mcg tablet, Take 1 tablet (1,000 mcg) by mouth once daily. (Patient taking differently: Take 0.5 tablets (500 mcg) by mouth once daily.), Disp: , Rfl:     docusate sodium (Colace) 100 mg capsule, Take 1 capsule (100 mg) by mouth 2 times a day., Disp: , Rfl:     Eliquis 2.5 mg tablet, Take 1 tablet (2.5 mg) by mouth 2 times a day., Disp: , Rfl:     ezetimibe-simvastatin (Vytorin) 10-40 mg tablet, TAKE ONE(1) TABLET BY MOUTH ONCE DAILY, Disp: , Rfl:     fluticasone (Flonase) 50 mcg/actuation nasal spray, USE 1 SPRAY INTO  EACH NOSTRIL TWICE DAILY, Disp: , Rfl:     folic acid (Folvite) 400 mcg tablet, Take by mouth once daily. (Patient taking differently: Take 2.5 tablets (1 mg) by mouth once daily.), Disp: , Rfl:     hydrOXYzine HCL (Atarax) 10 mg tablet, Take 1 tablet (10 mg) by mouth 3 times a day as needed for anxiety. Take twice daily; may take one tablet as needed., Disp: , Rfl:     hydrOXYzine HCL (Atarax) 10 mg tablet, Take 1 tablet (10 mg) by mouth 2 times a day., Disp: , Rfl:     Januvia 100 mg tablet, Take 1 tablet (100 mg) by mouth once daily. (Patient taking differently: Take 25 mg by mouth once daily.), Disp: , Rfl:     lamoTRIgine (LaMICtal) 200 mg tablet, Take 1 tablet (200 mg) by mouth 2 times a day., Disp: , Rfl:     levothyroxine (Synthroid, Levoxyl) 75 mcg tablet, Take 1 tablet (75 mcg) by mouth once daily., Disp: , Rfl:     LORazepam (Ativan) 1 mg tablet, 1 tablet (1 mg) 2 times a day., Disp: , Rfl:     memantine (Namenda) 10 mg tablet, Take 1 tablet (10 mg) by mouth 2 times a day. (Patient taking differently: Take 1 tablet (10 mg) by mouth once daily.), Disp: , Rfl:     metoprolol tartrate (Lopressor) 25 mg tablet, Take 1 tablet (25 mg) by mouth every 12 hours., Disp: , Rfl:     ondansetron ODT (Zofran-ODT) 4 mg disintegrating tablet, DISSOLVE 1 TABLET S/L EVERY 6 HOURS FOR NAUSEA., Disp: , Rfl:     polyethylene glycol (Glycolax, Miralax) 17 gram packet, Take 17 g by mouth once daily., Disp: , Rfl:     QUEtiapine (SEROquel) 50 mg tablet, Take 1 tablet (50 mg) by mouth once daily at bedtime., Disp: , Rfl:     sertraline (Zoloft) 50 mg tablet, Take 1.5 tablets (75 mg) by mouth once daily., Disp: , Rfl:     sulfamethoxazole-trimethoprim (Bactrim) 400-80 mg tablet, Take 1 tablet by mouth. Every other day, Disp: , Rfl:   No Known Allergies  There were no vitals taken for this visit.  Neurological Exam/Physical Exam:    Constitutional: General appearance: no acute distress.   Auscultation of Heart: Regular rate and  rhythm, no murmurs, normal S1 and S2.   Carotid Arteries: no bruits  Peripheral Vascular Exam: No swelling, edema or tenderness to palpation in extremities  Mental status: no distress, alert, interactive and cooperative. Affect is appropriate.   Orientation: oriented to person, oriented to place and oriented to time.   Eyes: The ophthalmoscopic examination was normal.   Cranial nerve II: Visual fields full to confrontation.   Cranial nerves III, IV, and VI: Pupils round, equally reactive to light; EOMs intact. No nystagmus.   Cranial Nerve V: Facial sensation intact to LT bilaterally.   Cranial nerve VII: no facial droop  Cranial nerve VIII: Hearing is intact  Cranial nerves IX and X: Palate elevates symmetrically.   Cranial nerve XI: Shoulder shrug intact.   Cranial nerve XII: Tongue is midline.  Motor:  Muscle bulk was normal in both upper and lower extremities.     L> right rigidity. Muscle strength was 5/5 throughout. L > R bradykinesia, moderate kinetic tremor on left more than right. She also has a postural tremor left more than right. Very mild and subtle resting tremor.   Deep Tendon Reflexes: left biceps 2+ , right biceps 2+, left triceps 2+, right triceps 2+, left brachioradialis 2+, right brachioradialis 2+, left patella 2+, right patella 2+, left ankle jerk 2+, right ankle jerk 2+     Sensory Exam: Normal to vibratory sensation  Coordination:  no limb dystaxia  Gait:  cautious.  Uses a walker.    Labs:  CBC:   Lab Results   Component Value Date    WBC 9.8 12/03/2024    HGB 11.8 (L) 12/03/2024    HCT 35.4 (L) 12/03/2024     12/03/2024     BMP:   Lab Results   Component Value Date     12/04/2024    K 4.0 12/04/2024     (H) 12/04/2024    CO2 27 12/04/2024    BUN 21 12/04/2024    CREATININE 1.02 12/04/2024    CALCIUM 8.4 (L) 12/04/2024    MG 1.91 03/20/2020    PHOS 3.3 03/18/2020     LFT:   Lab Results   Component Value Date    ALKPHOS 93 12/03/2024    BILITOT 0.4 12/03/2024    BILIDIR 0.1  03/15/2020    PROT 7.6 12/03/2024    ALBUMIN 4.5 12/03/2024    ALT <3 (L) 12/03/2024    AST 12 12/03/2024         Assessment/Plan   Problem List Items Addressed This Visit    None  Visit Diagnoses         Codes      Parkinson's disease without dyskinesia, with fluctuating manifestations    -  Primary G20.A2      Essential tremor     G25.0      History of stroke     Z86.73      Seizure disorder (Multi)     G40.909        No changes to medications.  Balance and tremors are worse.

## 2025-07-28 ENCOUNTER — APPOINTMENT (OUTPATIENT)
Facility: CLINIC | Age: 85
End: 2025-07-28
Payer: MEDICARE

## 2025-09-05 ENCOUNTER — APPOINTMENT (OUTPATIENT)
Dept: BEHAVIORAL HEALTH | Facility: CLINIC | Age: 85
End: 2025-09-05
Payer: MEDICARE

## 2025-12-05 ENCOUNTER — APPOINTMENT (OUTPATIENT)
Dept: NEUROLOGY | Facility: CLINIC | Age: 85
End: 2025-12-05
Payer: MEDICARE